# Patient Record
Sex: MALE | Race: WHITE | NOT HISPANIC OR LATINO | URBAN - METROPOLITAN AREA
[De-identification: names, ages, dates, MRNs, and addresses within clinical notes are randomized per-mention and may not be internally consistent; named-entity substitution may affect disease eponyms.]

---

## 2017-12-08 ENCOUNTER — OFFICE VISIT - HEALTHEAST (OUTPATIENT)
Dept: FAMILY MEDICINE | Facility: CLINIC | Age: 52
End: 2017-12-08

## 2017-12-08 DIAGNOSIS — G89.29 HEEL PAIN, CHRONIC, LEFT: ICD-10-CM

## 2017-12-08 DIAGNOSIS — M25.561 CHRONIC PAIN OF RIGHT KNEE: ICD-10-CM

## 2017-12-08 DIAGNOSIS — G89.29 CHRONIC PAIN OF RIGHT KNEE: ICD-10-CM

## 2017-12-08 DIAGNOSIS — K40.91 UNILATERAL RECURRENT INGUINAL HERNIA WITHOUT OBSTRUCTION OR GANGRENE: ICD-10-CM

## 2017-12-08 DIAGNOSIS — M79.672 HEEL PAIN, CHRONIC, LEFT: ICD-10-CM

## 2017-12-08 ASSESSMENT — MIFFLIN-ST. JEOR: SCORE: 1679.55

## 2017-12-11 ENCOUNTER — COMMUNICATION - HEALTHEAST (OUTPATIENT)
Dept: SURGERY | Facility: CLINIC | Age: 52
End: 2017-12-11

## 2017-12-13 ENCOUNTER — RECORDS - HEALTHEAST (OUTPATIENT)
Dept: ADMINISTRATIVE | Facility: OTHER | Age: 52
End: 2017-12-13

## 2017-12-14 ENCOUNTER — OFFICE VISIT - HEALTHEAST (OUTPATIENT)
Dept: SURGERY | Facility: CLINIC | Age: 52
End: 2017-12-14

## 2017-12-14 DIAGNOSIS — K40.90 NON-RECURRENT UNILATERAL INGUINAL HERNIA WITHOUT OBSTRUCTION OR GANGRENE: ICD-10-CM

## 2017-12-14 DIAGNOSIS — Z12.11 SCREENING FOR COLON CANCER: ICD-10-CM

## 2017-12-14 ASSESSMENT — MIFFLIN-ST. JEOR: SCORE: 1686.07

## 2017-12-18 ENCOUNTER — RECORDS - HEALTHEAST (OUTPATIENT)
Dept: ADMINISTRATIVE | Facility: OTHER | Age: 52
End: 2017-12-18

## 2017-12-22 ENCOUNTER — ANESTHESIA - HEALTHEAST (OUTPATIENT)
Dept: SURGERY | Facility: AMBULATORY SURGERY CENTER | Age: 52
End: 2017-12-22

## 2017-12-22 ASSESSMENT — MIFFLIN-ST. JEOR: SCORE: 1654.32

## 2017-12-27 ENCOUNTER — SURGERY - HEALTHEAST (OUTPATIENT)
Dept: SURGERY | Facility: AMBULATORY SURGERY CENTER | Age: 52
End: 2017-12-27

## 2017-12-27 ENCOUNTER — SURGERY - HEALTHEAST (OUTPATIENT)
Dept: SURGERY | Facility: CLINIC | Age: 52
End: 2017-12-27

## 2017-12-27 ENCOUNTER — AMBULATORY - HEALTHEAST (OUTPATIENT)
Dept: MULTI SPECIALTY CLINIC | Facility: CLINIC | Age: 52
End: 2017-12-27

## 2017-12-28 ASSESSMENT — MIFFLIN-ST. JEOR: SCORE: 1654.32

## 2017-12-29 ENCOUNTER — ANESTHESIA - HEALTHEAST (OUTPATIENT)
Dept: SURGERY | Facility: AMBULATORY SURGERY CENTER | Age: 52
End: 2017-12-29

## 2017-12-29 ENCOUNTER — SURGERY - HEALTHEAST (OUTPATIENT)
Dept: SURGERY | Facility: AMBULATORY SURGERY CENTER | Age: 52
End: 2017-12-29

## 2019-01-01 ENCOUNTER — OFFICE VISIT - HEALTHEAST (OUTPATIENT)
Dept: INTERNAL MEDICINE | Facility: CLINIC | Age: 54
End: 2019-01-01

## 2019-01-01 ENCOUNTER — OFFICE VISIT - HEALTHEAST (OUTPATIENT)
Dept: FAMILY MEDICINE | Facility: CLINIC | Age: 54
End: 2019-01-01

## 2019-01-01 ENCOUNTER — HOSPITAL ENCOUNTER (OUTPATIENT)
Dept: ULTRASOUND IMAGING | Facility: HOSPITAL | Age: 54
Discharge: HOME | End: 2019-09-09

## 2019-01-01 DIAGNOSIS — L03.115 CELLULITIS OF RIGHT LOWER EXTREMITY: ICD-10-CM

## 2019-01-01 DIAGNOSIS — Z78.9 NONSMOKER: ICD-10-CM

## 2019-01-01 DIAGNOSIS — M79.89 RIGHT LEG SWELLING: ICD-10-CM

## 2019-01-01 DIAGNOSIS — R03.0 ELEVATED BLOOD PRESSURE READING WITHOUT DIAGNOSIS OF HYPERTENSION: ICD-10-CM

## 2019-01-01 DIAGNOSIS — M70.51 INFRAPATELLAR BURSITIS OF RIGHT KNEE: ICD-10-CM

## 2019-01-01 DIAGNOSIS — N52.9 ERECTILE DYSFUNCTION, UNSPECIFIED ERECTILE DYSFUNCTION TYPE: ICD-10-CM

## 2019-01-01 DIAGNOSIS — L03.115 CELLULITIS OF RIGHT LEG: ICD-10-CM

## 2019-01-01 ASSESSMENT — MIFFLIN-ST. JEOR: SCORE: 1649.79

## 2020-01-01 ENCOUNTER — APPOINTMENT (OUTPATIENT)
Dept: GENERAL RADIOLOGY | Facility: CLINIC | Age: 55
DRG: 003 | End: 2020-01-01
Attending: STUDENT IN AN ORGANIZED HEALTH CARE EDUCATION/TRAINING PROGRAM
Payer: COMMERCIAL

## 2020-01-01 ENCOUNTER — APPOINTMENT (OUTPATIENT)
Dept: ULTRASOUND IMAGING | Facility: CLINIC | Age: 55
DRG: 003 | End: 2020-01-01
Attending: STUDENT IN AN ORGANIZED HEALTH CARE EDUCATION/TRAINING PROGRAM
Payer: COMMERCIAL

## 2020-01-01 ENCOUNTER — SURGERY (OUTPATIENT)
Age: 55
End: 2020-01-01
Payer: COMMERCIAL

## 2020-01-01 ENCOUNTER — ALLIED HEALTH/NURSE VISIT (OUTPATIENT)
Dept: NEUROLOGY | Facility: CLINIC | Age: 55
DRG: 003 | End: 2020-01-01
Attending: PSYCHIATRY & NEUROLOGY
Payer: COMMERCIAL

## 2020-01-01 ENCOUNTER — APPOINTMENT (OUTPATIENT)
Dept: GENERAL RADIOLOGY | Facility: CLINIC | Age: 55
DRG: 003 | End: 2020-01-01
Attending: NURSE PRACTITIONER
Payer: COMMERCIAL

## 2020-01-01 ENCOUNTER — APPOINTMENT (OUTPATIENT)
Dept: CARDIOLOGY | Facility: CLINIC | Age: 55
DRG: 003 | End: 2020-01-01
Attending: STUDENT IN AN ORGANIZED HEALTH CARE EDUCATION/TRAINING PROGRAM
Payer: COMMERCIAL

## 2020-01-01 ENCOUNTER — APPOINTMENT (OUTPATIENT)
Dept: CT IMAGING | Facility: CLINIC | Age: 55
DRG: 003 | End: 2020-01-01
Attending: PHYSICIAN ASSISTANT
Payer: COMMERCIAL

## 2020-01-01 ENCOUNTER — APPOINTMENT (OUTPATIENT)
Dept: CARDIOLOGY | Facility: CLINIC | Age: 55
DRG: 003 | End: 2020-01-01
Attending: NURSE PRACTITIONER
Payer: COMMERCIAL

## 2020-01-01 ENCOUNTER — APPOINTMENT (OUTPATIENT)
Dept: GENERAL RADIOLOGY | Facility: CLINIC | Age: 55
DRG: 003 | End: 2020-01-01
Attending: INTERNAL MEDICINE
Payer: COMMERCIAL

## 2020-01-01 ENCOUNTER — HOSPITAL ENCOUNTER (INPATIENT)
Facility: CLINIC | Age: 55
LOS: 3 days | DRG: 003 | End: 2020-04-14
Admitting: INTERNAL MEDICINE
Payer: COMMERCIAL

## 2020-01-01 ENCOUNTER — COMMUNICATION - HEALTHEAST (OUTPATIENT)
Dept: INTERNAL MEDICINE | Facility: CLINIC | Age: 55
End: 2020-01-01

## 2020-01-01 ENCOUNTER — APPOINTMENT (OUTPATIENT)
Dept: CT IMAGING | Facility: CLINIC | Age: 55
DRG: 003 | End: 2020-01-01
Attending: STUDENT IN AN ORGANIZED HEALTH CARE EDUCATION/TRAINING PROGRAM
Payer: COMMERCIAL

## 2020-01-01 VITALS
WEIGHT: 195.33 LBS | OXYGEN SATURATION: 100 % | RESPIRATION RATE: 14 BRPM | TEMPERATURE: 98.2 F | BODY MASS INDEX: 27.96 KG/M2 | HEIGHT: 70 IN

## 2020-01-01 DIAGNOSIS — I46.9 CARDIAC ARREST (H): Primary | ICD-10-CM

## 2020-01-01 DIAGNOSIS — N52.9 ERECTILE DYSFUNCTION, UNSPECIFIED ERECTILE DYSFUNCTION TYPE: ICD-10-CM

## 2020-01-01 DIAGNOSIS — I46.9 CARDIAC ARREST (H): ICD-10-CM

## 2020-01-01 LAB
ABO + RH BLD: NORMAL
ALBUMIN SERPL-MCNC: 2.5 G/DL (ref 3.4–5)
ALBUMIN SERPL-MCNC: 2.5 G/DL (ref 3.4–5)
ALBUMIN SERPL-MCNC: 2.6 G/DL (ref 3.4–5)
ALBUMIN SERPL-MCNC: 2.8 G/DL (ref 3.4–5)
ALBUMIN SERPL-MCNC: 2.9 G/DL (ref 3.4–5)
ALBUMIN SERPL-MCNC: 2.9 G/DL (ref 3.4–5)
ALBUMIN SERPL-MCNC: 3 G/DL (ref 3.4–5)
ALBUMIN SERPL-MCNC: 3 G/DL (ref 3.4–5)
ALBUMIN SERPL-MCNC: 3.1 G/DL (ref 3.4–5)
ALBUMIN SERPL-MCNC: 3.2 G/DL (ref 3.4–5)
ALBUMIN SERPL-MCNC: 3.4 G/DL (ref 3.4–5)
ALBUMIN UR-MCNC: 100 MG/DL
ALP SERPL-CCNC: 22 U/L (ref 40–150)
ALP SERPL-CCNC: 24 U/L (ref 40–150)
ALP SERPL-CCNC: 25 U/L (ref 40–150)
ALP SERPL-CCNC: 26 U/L (ref 40–150)
ALP SERPL-CCNC: 26 U/L (ref 40–150)
ALP SERPL-CCNC: 27 U/L (ref 40–150)
ALP SERPL-CCNC: 30 U/L (ref 40–150)
ALP SERPL-CCNC: 32 U/L (ref 40–150)
ALP SERPL-CCNC: 34 U/L (ref 40–150)
ALP SERPL-CCNC: 35 U/L (ref 40–150)
ALP SERPL-CCNC: 40 U/L (ref 40–150)
ALP SERPL-CCNC: 41 U/L (ref 40–150)
ALP SERPL-CCNC: 47 U/L (ref 40–150)
ALP SERPL-CCNC: 47 U/L (ref 40–150)
ALP SERPL-CCNC: 48 U/L (ref 40–150)
ALT SERPL W P-5'-P-CCNC: 130 U/L (ref 0–70)
ALT SERPL W P-5'-P-CCNC: 131 U/L (ref 0–70)
ALT SERPL W P-5'-P-CCNC: 138 U/L (ref 0–70)
ALT SERPL W P-5'-P-CCNC: 148 U/L (ref 0–70)
ALT SERPL W P-5'-P-CCNC: 155 U/L (ref 0–70)
ALT SERPL W P-5'-P-CCNC: 162 U/L (ref 0–70)
ALT SERPL W P-5'-P-CCNC: 164 U/L (ref 0–70)
ALT SERPL W P-5'-P-CCNC: 167 U/L (ref 0–70)
ALT SERPL W P-5'-P-CCNC: 175 U/L (ref 0–70)
ALT SERPL W P-5'-P-CCNC: 177 U/L (ref 0–70)
ALT SERPL W P-5'-P-CCNC: 198 U/L (ref 0–70)
ALT SERPL W P-5'-P-CCNC: 200 U/L (ref 0–70)
ALT SERPL W P-5'-P-CCNC: 209 U/L (ref 0–70)
ALT SERPL W P-5'-P-CCNC: 215 U/L (ref 0–70)
ALT SERPL W P-5'-P-CCNC: 219 U/L (ref 0–70)
AMPHETAMINES UR QL SCN: NEGATIVE
ANGLE RATE OF CLOT STRENGTH: 61.2 DEGREES (ref 53–72)
ANGLE RATE OF CLOT STRENGTH: 66.3 DEGREES (ref 53–72)
ANGLE RATE OF CLOT STRENGTH: 69.8 DEGREES (ref 53–72)
ANION GAP SERPL CALCULATED.3IONS-SCNC: 10 MMOL/L (ref 3–14)
ANION GAP SERPL CALCULATED.3IONS-SCNC: 11 MMOL/L (ref 3–14)
ANION GAP SERPL CALCULATED.3IONS-SCNC: 12 MMOL/L (ref 3–14)
ANION GAP SERPL CALCULATED.3IONS-SCNC: 12 MMOL/L (ref 3–14)
ANION GAP SERPL CALCULATED.3IONS-SCNC: 18 MMOL/L (ref 3–14)
ANION GAP SERPL CALCULATED.3IONS-SCNC: 3 MMOL/L (ref 3–14)
ANION GAP SERPL CALCULATED.3IONS-SCNC: 4 MMOL/L (ref 3–14)
ANION GAP SERPL CALCULATED.3IONS-SCNC: 6 MMOL/L (ref 3–14)
ANION GAP SERPL CALCULATED.3IONS-SCNC: 8 MMOL/L (ref 3–14)
ANION GAP SERPL CALCULATED.3IONS-SCNC: 8 MMOL/L (ref 3–14)
ANION GAP SERPL CALCULATED.3IONS-SCNC: 9 MMOL/L (ref 3–14)
APPEARANCE UR: ABNORMAL
APTT PPP: 107 SEC (ref 22–37)
APTT PPP: 119 SEC (ref 22–37)
APTT PPP: 60 SEC (ref 22–37)
APTT PPP: 63 SEC (ref 22–37)
APTT PPP: 67 SEC (ref 22–37)
APTT PPP: 73 SEC (ref 22–37)
APTT PPP: 82 SEC (ref 22–37)
APTT PPP: 86 SEC (ref 22–37)
APTT PPP: 90 SEC (ref 22–37)
APTT PPP: 94 SEC (ref 22–37)
APTT PPP: >240 SEC (ref 22–37)
AST SERPL W P-5'-P-CCNC: 1162 U/L (ref 0–45)
AST SERPL W P-5'-P-CCNC: 1206 U/L (ref 0–45)
AST SERPL W P-5'-P-CCNC: 1376 U/L (ref 0–45)
AST SERPL W P-5'-P-CCNC: 1466 U/L (ref 0–45)
AST SERPL W P-5'-P-CCNC: 1510 U/L (ref 0–45)
AST SERPL W P-5'-P-CCNC: 1575 U/L (ref 0–45)
AST SERPL W P-5'-P-CCNC: 310 U/L (ref 0–45)
AST SERPL W P-5'-P-CCNC: 585 U/L (ref 0–45)
AST SERPL W P-5'-P-CCNC: 599 U/L (ref 0–45)
AST SERPL W P-5'-P-CCNC: 672 U/L (ref 0–45)
AST SERPL W P-5'-P-CCNC: 724 U/L (ref 0–45)
AST SERPL W P-5'-P-CCNC: 795 U/L (ref 0–45)
AST SERPL W P-5'-P-CCNC: 905 U/L (ref 0–45)
AST SERPL W P-5'-P-CCNC: 967 U/L (ref 0–45)
AST SERPL W P-5'-P-CCNC: 996 U/L (ref 0–45)
AT III ACT/NOR PPP CHRO: 48 % (ref 85–135)
AT III ACT/NOR PPP CHRO: 52 % (ref 85–135)
AT III ACT/NOR PPP CHRO: 54 % (ref 85–135)
AT III ACT/NOR PPP CHRO: 68 % (ref 85–135)
BACTERIA SPEC CULT: NO GROWTH
BACTERIA SPEC CULT: NO GROWTH
BACTERIA SPEC CULT: NORMAL
BARBITURATES UR QL: NEGATIVE
BASE DEFICIT BLDA-SCNC: 0.3 MMOL/L
BASE DEFICIT BLDA-SCNC: 0.4 MMOL/L
BASE DEFICIT BLDA-SCNC: 0.5 MMOL/L
BASE DEFICIT BLDA-SCNC: 0.7 MMOL/L
BASE DEFICIT BLDA-SCNC: 0.8 MMOL/L
BASE DEFICIT BLDA-SCNC: 0.9 MMOL/L
BASE DEFICIT BLDA-SCNC: 1 MMOL/L
BASE DEFICIT BLDA-SCNC: 1.2 MMOL/L
BASE DEFICIT BLDA-SCNC: 1.3 MMOL/L
BASE DEFICIT BLDA-SCNC: 1.5 MMOL/L
BASE DEFICIT BLDA-SCNC: 1.7 MMOL/L
BASE DEFICIT BLDA-SCNC: 1.9 MMOL/L
BASE DEFICIT BLDA-SCNC: 1.9 MMOL/L
BASE DEFICIT BLDA-SCNC: 13.4 MMOL/L
BASE DEFICIT BLDA-SCNC: 2 MMOL/L
BASE DEFICIT BLDA-SCNC: 2.3 MMOL/L
BASE DEFICIT BLDA-SCNC: 2.5 MMOL/L
BASE DEFICIT BLDA-SCNC: 2.6 MMOL/L
BASE DEFICIT BLDA-SCNC: 2.7 MMOL/L
BASE DEFICIT BLDA-SCNC: 2.8 MMOL/L
BASE DEFICIT BLDA-SCNC: 3.1 MMOL/L
BASE DEFICIT BLDA-SCNC: 3.1 MMOL/L
BASE DEFICIT BLDA-SCNC: 3.4 MMOL/L
BASE DEFICIT BLDA-SCNC: 4.9 MMOL/L
BASE DEFICIT BLDA-SCNC: 5 MMOL/L
BASE DEFICIT BLDA-SCNC: 6.8 MMOL/L
BASE DEFICIT BLDA-SCNC: 7.1 MMOL/L
BASE DEFICIT BLDA-SCNC: 9 MMOL/L
BASE DEFICIT BLDA-SCNC: 9.2 MMOL/L
BASE DEFICIT BLDA-SCNC: NORMAL MMOL/L
BASE DEFICIT BLDA-SCNC: NORMAL MMOL/L
BASE DEFICIT BLDV-SCNC: 0.5 MMOL/L
BASE DEFICIT BLDV-SCNC: 1.5 MMOL/L
BASE DEFICIT BLDV-SCNC: 1.8 MMOL/L
BASE DEFICIT BLDV-SCNC: 4.3 MMOL/L
BASE EXCESS BLDA CALC-SCNC: 0 MMOL/L
BASE EXCESS BLDA CALC-SCNC: 0.1 MMOL/L
BASE EXCESS BLDA CALC-SCNC: 0.1 MMOL/L
BASE EXCESS BLDA CALC-SCNC: 0.2 MMOL/L
BASE EXCESS BLDA CALC-SCNC: 0.3 MMOL/L
BASE EXCESS BLDA CALC-SCNC: 0.4 MMOL/L
BASE EXCESS BLDA CALC-SCNC: 0.5 MMOL/L
BASE EXCESS BLDA CALC-SCNC: 0.7 MMOL/L
BASE EXCESS BLDA CALC-SCNC: 0.7 MMOL/L
BASE EXCESS BLDA CALC-SCNC: 0.8 MMOL/L
BASE EXCESS BLDA CALC-SCNC: NORMAL MMOL/L
BASE EXCESS BLDA CALC-SCNC: NORMAL MMOL/L
BASE EXCESS BLDV CALC-SCNC: 0.1 MMOL/L
BASE EXCESS BLDV CALC-SCNC: 0.5 MMOL/L
BASE EXCESS BLDV CALC-SCNC: 0.5 MMOL/L
BASE EXCESS BLDV CALC-SCNC: 0.6 MMOL/L
BASE EXCESS BLDV CALC-SCNC: 1.1 MMOL/L
BASOPHILS # BLD AUTO: 0 10E9/L (ref 0–0.2)
BASOPHILS # BLD AUTO: 0 10E9/L (ref 0–0.2)
BASOPHILS NFR BLD AUTO: 0.4 %
BASOPHILS NFR BLD AUTO: 0.4 %
BENZODIAZ UR QL: NEGATIVE
BILIRUB SERPL-MCNC: 0.4 MG/DL (ref 0.2–1.3)
BILIRUB SERPL-MCNC: 0.5 MG/DL (ref 0.2–1.3)
BILIRUB SERPL-MCNC: 0.5 MG/DL (ref 0.2–1.3)
BILIRUB SERPL-MCNC: 0.6 MG/DL (ref 0.2–1.3)
BILIRUB SERPL-MCNC: 0.7 MG/DL (ref 0.2–1.3)
BILIRUB SERPL-MCNC: 0.7 MG/DL (ref 0.2–1.3)
BILIRUB SERPL-MCNC: 0.8 MG/DL (ref 0.2–1.3)
BILIRUB SERPL-MCNC: 0.8 MG/DL (ref 0.2–1.3)
BILIRUB SERPL-MCNC: 0.9 MG/DL (ref 0.2–1.3)
BILIRUB SERPL-MCNC: 1 MG/DL (ref 0.2–1.3)
BILIRUB SERPL-MCNC: 1 MG/DL (ref 0.2–1.3)
BILIRUB SERPL-MCNC: 1.2 MG/DL (ref 0.2–1.3)
BILIRUB SERPL-MCNC: 1.2 MG/DL (ref 0.2–1.3)
BILIRUB UR QL STRIP: NEGATIVE
BLD GP AB SCN SERPL QL: NORMAL
BLD GP AB SCN SERPL QL: NORMAL
BLD PROD TYP BPU: NORMAL
BLD UNIT ID BPU: 0
BLOOD BANK CMNT PATIENT-IMP: NORMAL
BLOOD BANK CMNT PATIENT-IMP: NORMAL
BLOOD PRODUCT CODE: NORMAL
BPU ID: NORMAL
BUN SERPL-MCNC: 15 MG/DL (ref 7–30)
BUN SERPL-MCNC: 16 MG/DL (ref 7–30)
BUN SERPL-MCNC: 19 MG/DL (ref 7–30)
BUN SERPL-MCNC: 22 MG/DL (ref 7–30)
BUN SERPL-MCNC: 25 MG/DL (ref 7–30)
BUN SERPL-MCNC: 28 MG/DL (ref 7–30)
BUN SERPL-MCNC: 31 MG/DL (ref 7–30)
BUN SERPL-MCNC: 34 MG/DL (ref 7–30)
BUN SERPL-MCNC: 36 MG/DL (ref 7–30)
BUN SERPL-MCNC: 41 MG/DL (ref 7–30)
BUN SERPL-MCNC: 42 MG/DL (ref 7–30)
BUN SERPL-MCNC: 42 MG/DL (ref 7–30)
BUN SERPL-MCNC: 43 MG/DL (ref 7–30)
BUN SERPL-MCNC: 43 MG/DL (ref 7–30)
BUN SERPL-MCNC: 44 MG/DL (ref 7–30)
CA-I BLD-MCNC: 3.7 MG/DL (ref 4.4–5.2)
CA-I BLD-MCNC: 3.7 MG/DL (ref 4.4–5.2)
CA-I BLD-MCNC: 3.8 MG/DL (ref 4.4–5.2)
CA-I BLD-MCNC: 3.9 MG/DL (ref 4.4–5.2)
CA-I BLD-MCNC: 4 MG/DL (ref 4.4–5.2)
CA-I BLD-MCNC: 4.1 MG/DL (ref 4.4–5.2)
CA-I BLD-MCNC: 4.3 MG/DL (ref 4.4–5.2)
CA-I BLD-MCNC: 4.4 MG/DL (ref 4.4–5.2)
CA-I BLD-MCNC: 4.5 MG/DL (ref 4.4–5.2)
CA-I BLD-MCNC: 4.6 MG/DL (ref 4.4–5.2)
CA-I BLD-MCNC: 4.6 MG/DL (ref 4.4–5.2)
CA-I BLD-MCNC: NORMAL MG/DL (ref 4.4–5.2)
CALCIUM SERPL-MCNC: 6.8 MG/DL (ref 8.5–10.1)
CALCIUM SERPL-MCNC: 7 MG/DL (ref 8.5–10.1)
CALCIUM SERPL-MCNC: 7.1 MG/DL (ref 8.5–10.1)
CALCIUM SERPL-MCNC: 7.2 MG/DL (ref 8.5–10.1)
CALCIUM SERPL-MCNC: 7.2 MG/DL (ref 8.5–10.1)
CALCIUM SERPL-MCNC: 7.3 MG/DL (ref 8.5–10.1)
CALCIUM SERPL-MCNC: 7.4 MG/DL (ref 8.5–10.1)
CALCIUM SERPL-MCNC: 7.7 MG/DL (ref 8.5–10.1)
CALCIUM SERPL-MCNC: 7.8 MG/DL (ref 8.5–10.1)
CALCIUM SERPL-MCNC: 7.8 MG/DL (ref 8.5–10.1)
CALCIUM SERPL-MCNC: 8 MG/DL (ref 8.5–10.1)
CANNABINOIDS UR QL SCN: NEGATIVE
CHLORIDE SERPL-SCNC: 108 MMOL/L (ref 94–109)
CHLORIDE SERPL-SCNC: 112 MMOL/L (ref 94–109)
CHLORIDE SERPL-SCNC: 113 MMOL/L (ref 94–109)
CHLORIDE SERPL-SCNC: 114 MMOL/L (ref 94–109)
CHLORIDE SERPL-SCNC: 115 MMOL/L (ref 94–109)
CHLORIDE SERPL-SCNC: 116 MMOL/L (ref 94–109)
CHLORIDE SERPL-SCNC: 117 MMOL/L (ref 94–109)
CHLORIDE SERPL-SCNC: 118 MMOL/L (ref 94–109)
CHLORIDE SERPL-SCNC: 120 MMOL/L (ref 94–109)
CHLORIDE SERPL-SCNC: 122 MMOL/L (ref 94–109)
CHLORIDE SERPL-SCNC: 123 MMOL/L (ref 94–109)
CI HYPOCOAGULATION INDEX: 1 RATIO (ref 0–3)
CI HYPOCOAGULATION INDEX: 1.2 RATIO (ref 0–3)
CI HYPOCOAGULATION INDEX: 2.9 RATIO (ref 0–3)
CO2 SERPL-SCNC: 18 MMOL/L (ref 20–32)
CO2 SERPL-SCNC: 20 MMOL/L (ref 20–32)
CO2 SERPL-SCNC: 22 MMOL/L (ref 20–32)
CO2 SERPL-SCNC: 23 MMOL/L (ref 20–32)
CO2 SERPL-SCNC: 25 MMOL/L (ref 20–32)
CO2 SERPL-SCNC: 26 MMOL/L (ref 20–32)
COCAINE UR QL: NEGATIVE
COLOR UR AUTO: YELLOW
CORTIS SERPL-MCNC: 24.6 UG/DL (ref 4–22)
CREAT SERPL-MCNC: 1.14 MG/DL (ref 0.66–1.25)
CREAT SERPL-MCNC: 1.14 MG/DL (ref 0.66–1.25)
CREAT SERPL-MCNC: 1.18 MG/DL (ref 0.66–1.25)
CREAT SERPL-MCNC: 1.5 MG/DL (ref 0.66–1.25)
CREAT SERPL-MCNC: 1.61 MG/DL (ref 0.66–1.25)
CREAT SERPL-MCNC: 1.83 MG/DL (ref 0.66–1.25)
CREAT SERPL-MCNC: 2.14 MG/DL (ref 0.66–1.25)
CREAT SERPL-MCNC: 2.16 MG/DL (ref 0.66–1.25)
CREAT SERPL-MCNC: 2.18 MG/DL (ref 0.66–1.25)
CREAT SERPL-MCNC: 2.21 MG/DL (ref 0.66–1.25)
CREAT SERPL-MCNC: 2.23 MG/DL (ref 0.66–1.25)
CREAT SERPL-MCNC: 2.42 MG/DL (ref 0.66–1.25)
CREAT SERPL-MCNC: 2.46 MG/DL (ref 0.66–1.25)
CREAT SERPL-MCNC: 2.6 MG/DL (ref 0.66–1.25)
CREAT SERPL-MCNC: 2.68 MG/DL (ref 0.66–1.25)
CRP SERPL-MCNC: 180 MG/L (ref 0–8)
CRP SERPL-MCNC: 290 MG/L (ref 0–8)
CRP SERPL-MCNC: 32 MG/L (ref 0–8)
CRP SERPL-MCNC: <2.9 MG/L (ref 0–8)
D DIMER PPP FEU-MCNC: 0.9 UG/ML FEU (ref 0–0.5)
D DIMER PPP FEU-MCNC: 1.1 UG/ML FEU (ref 0–0.5)
D DIMER PPP FEU-MCNC: 1.1 UG/ML FEU (ref 0–0.5)
D DIMER PPP FEU-MCNC: 1.2 UG/ML FEU (ref 0–0.5)
D DIMER PPP FEU-MCNC: 1.6 UG/ML FEU (ref 0–0.5)
D DIMER PPP FEU-MCNC: 11.5 UG/ML FEU (ref 0–0.5)
D DIMER PPP FEU-MCNC: 2.7 UG/ML FEU (ref 0–0.5)
D DIMER PPP FEU-MCNC: 8 UG/ML FEU (ref 0–0.5)
D DIMER PPP FEU-MCNC: 9.9 UG/ML FEU (ref 0–0.5)
DIFFERENTIAL METHOD BLD: ABNORMAL
DIFFERENTIAL METHOD BLD: ABNORMAL
EOSINOPHIL # BLD AUTO: 0 10E9/L (ref 0–0.7)
EOSINOPHIL # BLD AUTO: 0.1 10E9/L (ref 0–0.7)
EOSINOPHIL NFR BLD AUTO: 0.3 %
EOSINOPHIL NFR BLD AUTO: 0.7 %
ERYTHROCYTE [DISTWIDTH] IN BLOOD BY AUTOMATED COUNT: 13.1 % (ref 10–15)
ERYTHROCYTE [DISTWIDTH] IN BLOOD BY AUTOMATED COUNT: 13.2 % (ref 10–15)
ERYTHROCYTE [DISTWIDTH] IN BLOOD BY AUTOMATED COUNT: 13.2 % (ref 10–15)
ERYTHROCYTE [DISTWIDTH] IN BLOOD BY AUTOMATED COUNT: 13.4 % (ref 10–15)
ERYTHROCYTE [DISTWIDTH] IN BLOOD BY AUTOMATED COUNT: 13.8 % (ref 10–15)
ERYTHROCYTE [DISTWIDTH] IN BLOOD BY AUTOMATED COUNT: 14.2 % (ref 10–15)
ERYTHROCYTE [DISTWIDTH] IN BLOOD BY AUTOMATED COUNT: 14.7 % (ref 10–15)
ERYTHROCYTE [DISTWIDTH] IN BLOOD BY AUTOMATED COUNT: 14.8 % (ref 10–15)
ERYTHROCYTE [DISTWIDTH] IN BLOOD BY AUTOMATED COUNT: 15.2 % (ref 10–15)
ERYTHROCYTE [DISTWIDTH] IN BLOOD BY AUTOMATED COUNT: 15.4 % (ref 10–15)
ERYTHROCYTE [DISTWIDTH] IN BLOOD BY AUTOMATED COUNT: 15.5 % (ref 10–15)
ERYTHROCYTE [DISTWIDTH] IN BLOOD BY AUTOMATED COUNT: 16.9 % (ref 10–15)
ERYTHROCYTE [DISTWIDTH] IN BLOOD BY AUTOMATED COUNT: 17.2 % (ref 10–15)
ERYTHROCYTE [SEDIMENTATION RATE] IN BLOOD BY WESTERGREN METHOD: 15 MM/H (ref 0–20)
ERYTHROCYTE [SEDIMENTATION RATE] IN BLOOD BY WESTERGREN METHOD: 18 MM/H (ref 0–20)
ERYTHROCYTE [SEDIMENTATION RATE] IN BLOOD BY WESTERGREN METHOD: 82 MM/H (ref 0–20)
ERYTHROCYTE [SEDIMENTATION RATE] IN BLOOD BY WESTERGREN METHOD: 9 MM/H (ref 0–20)
ETHANOL UR QL SCN: NEGATIVE
FIBRINOGEN PPP-MCNC: 186 MG/DL (ref 200–420)
FIBRINOGEN PPP-MCNC: 250 MG/DL (ref 200–420)
FIBRINOGEN PPP-MCNC: 252 MG/DL (ref 200–420)
FIBRINOGEN PPP-MCNC: 358 MG/DL (ref 200–420)
FIBRINOGEN PPP-MCNC: 559 MG/DL (ref 200–420)
FIBRINOGEN PPP-MCNC: 743 MG/DL (ref 200–420)
FIBRINOGEN PPP-MCNC: 778 MG/DL (ref 200–420)
G ACTUAL CLOT STRENGTH: 6.4 KD/SC (ref 4.5–11)
G ACTUAL CLOT STRENGTH: 6.5 KD/SC (ref 4.5–11)
G ACTUAL CLOT STRENGTH: 9.3 KD/SC (ref 4.5–11)
GFR SERPL CREATININE-BSD FRML MDRD: 18 ML/MIN/{1.73_M2}
GFR SERPL CREATININE-BSD FRML MDRD: 21 ML/MIN/{1.73_M2}
GFR SERPL CREATININE-BSD FRML MDRD: 21 ML/MIN/{1.73_M2}
GFR SERPL CREATININE-BSD FRML MDRD: 26 ML/MIN/{1.73_M2}
GFR SERPL CREATININE-BSD FRML MDRD: 26 ML/MIN/{1.73_M2}
GFR SERPL CREATININE-BSD FRML MDRD: 27 ML/MIN/{1.73_M2}
GFR SERPL CREATININE-BSD FRML MDRD: 29 ML/MIN/{1.73_M2}
GFR SERPL CREATININE-BSD FRML MDRD: 30 ML/MIN/{1.73_M2}
GFR SERPL CREATININE-BSD FRML MDRD: 32 ML/MIN/{1.73_M2}
GFR SERPL CREATININE-BSD FRML MDRD: 32 ML/MIN/{1.73_M2}
GFR SERPL CREATININE-BSD FRML MDRD: 33 ML/MIN/{1.73_M2}
GFR SERPL CREATININE-BSD FRML MDRD: 34 ML/MIN/{1.73_M2}
GFR SERPL CREATININE-BSD FRML MDRD: 44 ML/MIN/{1.73_M2}
GFR SERPL CREATININE-BSD FRML MDRD: 46 ML/MIN/{1.73_M2}
GFR SERPL CREATININE-BSD FRML MDRD: 46 ML/MIN/{1.73_M2}
GLUCOSE BLD-MCNC: 111 MG/DL (ref 70–99)
GLUCOSE BLD-MCNC: 111 MG/DL (ref 70–99)
GLUCOSE BLD-MCNC: 112 MG/DL (ref 70–99)
GLUCOSE BLD-MCNC: 118 MG/DL (ref 70–99)
GLUCOSE BLD-MCNC: 121 MG/DL (ref 70–99)
GLUCOSE BLD-MCNC: 124 MG/DL (ref 70–99)
GLUCOSE BLD-MCNC: 127 MG/DL (ref 70–99)
GLUCOSE BLD-MCNC: 146 MG/DL (ref 70–99)
GLUCOSE BLD-MCNC: 153 MG/DL (ref 70–99)
GLUCOSE BLD-MCNC: 162 MG/DL (ref 70–99)
GLUCOSE BLD-MCNC: 168 MG/DL (ref 70–99)
GLUCOSE BLD-MCNC: 177 MG/DL (ref 70–99)
GLUCOSE BLD-MCNC: 179 MG/DL (ref 70–99)
GLUCOSE BLD-MCNC: 215 MG/DL (ref 70–99)
GLUCOSE BLD-MCNC: 221 MG/DL (ref 70–99)
GLUCOSE BLD-MCNC: 246 MG/DL (ref 70–99)
GLUCOSE BLD-MCNC: 332 MG/DL (ref 70–99)
GLUCOSE BLD-MCNC: 95 MG/DL (ref 70–99)
GLUCOSE BLD-MCNC: 97 MG/DL (ref 70–99)
GLUCOSE BLD-MCNC: 99 MG/DL (ref 70–99)
GLUCOSE BLD-MCNC: NORMAL MG/DL (ref 70–99)
GLUCOSE BLDC GLUCOMTR-MCNC: 103 MG/DL (ref 70–99)
GLUCOSE BLDC GLUCOMTR-MCNC: 105 MG/DL (ref 70–99)
GLUCOSE BLDC GLUCOMTR-MCNC: 106 MG/DL (ref 70–99)
GLUCOSE BLDC GLUCOMTR-MCNC: 107 MG/DL (ref 70–99)
GLUCOSE BLDC GLUCOMTR-MCNC: 108 MG/DL (ref 70–99)
GLUCOSE BLDC GLUCOMTR-MCNC: 108 MG/DL (ref 70–99)
GLUCOSE BLDC GLUCOMTR-MCNC: 110 MG/DL (ref 70–99)
GLUCOSE BLDC GLUCOMTR-MCNC: 110 MG/DL (ref 70–99)
GLUCOSE BLDC GLUCOMTR-MCNC: 115 MG/DL (ref 70–99)
GLUCOSE BLDC GLUCOMTR-MCNC: 117 MG/DL (ref 70–99)
GLUCOSE BLDC GLUCOMTR-MCNC: 118 MG/DL (ref 70–99)
GLUCOSE BLDC GLUCOMTR-MCNC: 119 MG/DL (ref 70–99)
GLUCOSE BLDC GLUCOMTR-MCNC: 119 MG/DL (ref 70–99)
GLUCOSE BLDC GLUCOMTR-MCNC: 120 MG/DL (ref 70–99)
GLUCOSE BLDC GLUCOMTR-MCNC: 121 MG/DL (ref 70–99)
GLUCOSE BLDC GLUCOMTR-MCNC: 122 MG/DL (ref 70–99)
GLUCOSE BLDC GLUCOMTR-MCNC: 122 MG/DL (ref 70–99)
GLUCOSE BLDC GLUCOMTR-MCNC: 123 MG/DL (ref 70–99)
GLUCOSE BLDC GLUCOMTR-MCNC: 125 MG/DL (ref 70–99)
GLUCOSE BLDC GLUCOMTR-MCNC: 126 MG/DL (ref 70–99)
GLUCOSE BLDC GLUCOMTR-MCNC: 137 MG/DL (ref 70–99)
GLUCOSE BLDC GLUCOMTR-MCNC: 144 MG/DL (ref 70–99)
GLUCOSE BLDC GLUCOMTR-MCNC: 145 MG/DL (ref 70–99)
GLUCOSE BLDC GLUCOMTR-MCNC: 149 MG/DL (ref 70–99)
GLUCOSE BLDC GLUCOMTR-MCNC: 154 MG/DL (ref 70–99)
GLUCOSE BLDC GLUCOMTR-MCNC: 158 MG/DL (ref 70–99)
GLUCOSE BLDC GLUCOMTR-MCNC: 163 MG/DL (ref 70–99)
GLUCOSE BLDC GLUCOMTR-MCNC: 164 MG/DL (ref 70–99)
GLUCOSE BLDC GLUCOMTR-MCNC: 169 MG/DL (ref 70–99)
GLUCOSE BLDC GLUCOMTR-MCNC: 179 MG/DL (ref 70–99)
GLUCOSE BLDC GLUCOMTR-MCNC: 195 MG/DL (ref 70–99)
GLUCOSE BLDC GLUCOMTR-MCNC: 210 MG/DL (ref 70–99)
GLUCOSE BLDC GLUCOMTR-MCNC: 90 MG/DL (ref 70–99)
GLUCOSE BLDC GLUCOMTR-MCNC: 93 MG/DL (ref 70–99)
GLUCOSE BLDC GLUCOMTR-MCNC: 96 MG/DL (ref 70–99)
GLUCOSE SERPL-MCNC: 106 MG/DL (ref 70–99)
GLUCOSE SERPL-MCNC: 112 MG/DL (ref 70–99)
GLUCOSE SERPL-MCNC: 119 MG/DL (ref 70–99)
GLUCOSE SERPL-MCNC: 121 MG/DL (ref 70–99)
GLUCOSE SERPL-MCNC: 122 MG/DL (ref 70–99)
GLUCOSE SERPL-MCNC: 126 MG/DL (ref 70–99)
GLUCOSE SERPL-MCNC: 134 MG/DL (ref 70–99)
GLUCOSE SERPL-MCNC: 150 MG/DL (ref 70–99)
GLUCOSE SERPL-MCNC: 169 MG/DL (ref 70–99)
GLUCOSE SERPL-MCNC: 172 MG/DL (ref 70–99)
GLUCOSE SERPL-MCNC: 174 MG/DL (ref 70–99)
GLUCOSE SERPL-MCNC: 187 MG/DL (ref 70–99)
GLUCOSE SERPL-MCNC: 215 MG/DL (ref 70–99)
GLUCOSE SERPL-MCNC: 95 MG/DL (ref 70–99)
GLUCOSE SERPL-MCNC: 99 MG/DL (ref 70–99)
GLUCOSE UR STRIP-MCNC: 70 MG/DL
GRAM STN SPEC: NORMAL
GRAM STN SPEC: NORMAL
HBA1C MFR BLD: 5.1 % (ref 0–5.6)
HCO3 BLD-SCNC: 16 MMOL/L (ref 21–28)
HCO3 BLD-SCNC: 17 MMOL/L (ref 21–28)
HCO3 BLD-SCNC: 17 MMOL/L (ref 21–28)
HCO3 BLD-SCNC: 18 MMOL/L (ref 21–28)
HCO3 BLD-SCNC: 19 MMOL/L (ref 21–28)
HCO3 BLD-SCNC: 19 MMOL/L (ref 21–28)
HCO3 BLD-SCNC: 21 MMOL/L (ref 21–28)
HCO3 BLD-SCNC: 22 MMOL/L (ref 21–28)
HCO3 BLD-SCNC: 23 MMOL/L (ref 21–28)
HCO3 BLD-SCNC: 24 MMOL/L (ref 21–28)
HCO3 BLD-SCNC: 25 MMOL/L (ref 21–28)
HCO3 BLD-SCNC: 26 MMOL/L (ref 21–28)
HCO3 BLD-SCNC: NORMAL MMOL/L (ref 21–28)
HCO3 BLDA-SCNC: 21 MMOL/L (ref 21–28)
HCO3 BLDA-SCNC: 23 MMOL/L (ref 21–28)
HCO3 BLDA-SCNC: 24 MMOL/L (ref 21–28)
HCO3 BLDA-SCNC: 24 MMOL/L (ref 21–28)
HCO3 BLDA-SCNC: 25 MMOL/L (ref 21–28)
HCO3 BLDA-SCNC: 26 MMOL/L (ref 21–28)
HCO3 BLDA-SCNC: 26 MMOL/L (ref 21–28)
HCO3 BLDA-SCNC: NORMAL MMOL/L (ref 21–28)
HCO3 BLDV-SCNC: 23 MMOL/L (ref 21–28)
HCO3 BLDV-SCNC: 24 MMOL/L (ref 21–28)
HCO3 BLDV-SCNC: 26 MMOL/L (ref 21–28)
HCO3 BLDV-SCNC: 27 MMOL/L (ref 21–28)
HCT VFR BLD AUTO: 22.8 % (ref 40–53)
HCT VFR BLD AUTO: 22.9 % (ref 40–53)
HCT VFR BLD AUTO: 23.5 % (ref 40–53)
HCT VFR BLD AUTO: 24.2 % (ref 40–53)
HCT VFR BLD AUTO: 24.2 % (ref 40–53)
HCT VFR BLD AUTO: 24.4 % (ref 40–53)
HCT VFR BLD AUTO: 24.8 % (ref 40–53)
HCT VFR BLD AUTO: 25.5 % (ref 40–53)
HCT VFR BLD AUTO: 25.6 % (ref 40–53)
HCT VFR BLD AUTO: 25.6 % (ref 40–53)
HCT VFR BLD AUTO: 25.9 % (ref 40–53)
HCT VFR BLD AUTO: 27 % (ref 40–53)
HCT VFR BLD AUTO: 36 % (ref 40–53)
HCT VFR BLD AUTO: 37 % (ref 40–53)
HCT VFR BLD AUTO: 37.1 % (ref 40–53)
HGB BLD-MCNC: 11.1 G/DL (ref 13.3–17.7)
HGB BLD-MCNC: 11.5 G/DL (ref 13.3–17.7)
HGB BLD-MCNC: 12.1 G/DL (ref 13.3–17.7)
HGB BLD-MCNC: 12.2 G/DL (ref 13.3–17.7)
HGB BLD-MCNC: 12.2 G/DL (ref 13.3–17.7)
HGB BLD-MCNC: 14 G/DL (ref 13.3–17.7)
HGB BLD-MCNC: 7.3 G/DL (ref 13.3–17.7)
HGB BLD-MCNC: 7.6 G/DL (ref 13.3–17.7)
HGB BLD-MCNC: 7.7 G/DL (ref 13.3–17.7)
HGB BLD-MCNC: 7.9 G/DL (ref 13.3–17.7)
HGB BLD-MCNC: 7.9 G/DL (ref 13.3–17.7)
HGB BLD-MCNC: 8 G/DL (ref 13.3–17.7)
HGB BLD-MCNC: 8.1 G/DL (ref 13.3–17.7)
HGB BLD-MCNC: 8.4 G/DL (ref 13.3–17.7)
HGB BLD-MCNC: 8.5 G/DL (ref 13.3–17.7)
HGB BLD-MCNC: 8.7 G/DL (ref 13.3–17.7)
HGB BLD-MCNC: 8.8 G/DL (ref 13.3–17.7)
HGB BLD-MCNC: 9.1 G/DL (ref 13.3–17.7)
HGB FREE PLAS-MCNC: 30 MG/DL
HGB FREE PLAS-MCNC: 50 MG/DL
HGB FREE PLAS-MCNC: 70 MG/DL
HGB FREE PLAS-MCNC: <30 MG/DL
HGB UR QL STRIP: ABNORMAL
IMM GRANULOCYTES # BLD: 0.1 10E9/L (ref 0–0.4)
IMM GRANULOCYTES # BLD: 0.2 10E9/L (ref 0–0.4)
IMM GRANULOCYTES NFR BLD: 1.5 %
IMM GRANULOCYTES NFR BLD: 1.7 %
INR PPP: 1.15 (ref 0.86–1.14)
INR PPP: 1.15 (ref 0.86–1.14)
INR PPP: 1.23 (ref 0.86–1.14)
INR PPP: 1.29 (ref 0.86–1.14)
INR PPP: 1.37 (ref 0.86–1.14)
INR PPP: 1.37 (ref 0.86–1.14)
INR PPP: 1.38 (ref 0.86–1.14)
INR PPP: 1.44 (ref 0.86–1.14)
INR PPP: 1.44 (ref 0.86–1.14)
INR PPP: 1.45 (ref 0.86–1.14)
INR PPP: 1.46 (ref 0.86–1.14)
INR PPP: 1.49 (ref 0.86–1.14)
INR PPP: 1.52 (ref 0.86–1.14)
INR PPP: 1.53 (ref 0.86–1.14)
INTERPRETATION ECG - MUSE: NORMAL
K TIME TO SPEC CLOT STRENGTH: 1.4 MINUTE (ref 1–3)
K TIME TO SPEC CLOT STRENGTH: 1.8 MINUTE (ref 1–3)
K TIME TO SPEC CLOT STRENGTH: 2.1 MINUTE (ref 1–3)
KCT BLD-ACNC: 146 SEC (ref 75–150)
KCT BLD-ACNC: 150 SEC (ref 75–150)
KCT BLD-ACNC: 151 SEC (ref 75–150)
KCT BLD-ACNC: 154 SEC (ref 75–150)
KCT BLD-ACNC: 155 SEC (ref 75–150)
KCT BLD-ACNC: 158 SEC (ref 75–150)
KCT BLD-ACNC: 159 SEC (ref 75–150)
KCT BLD-ACNC: 162 SEC (ref 75–150)
KCT BLD-ACNC: 163 SEC (ref 75–150)
KCT BLD-ACNC: 167 SEC (ref 75–150)
KCT BLD-ACNC: 167 SEC (ref 75–150)
KCT BLD-ACNC: 171 SEC (ref 75–150)
KCT BLD-ACNC: 171 SEC (ref 75–150)
KCT BLD-ACNC: 175 SEC (ref 75–150)
KCT BLD-ACNC: 179 SEC (ref 75–150)
KCT BLD-ACNC: 179 SEC (ref 75–150)
KCT BLD-ACNC: 199 SEC (ref 75–150)
KCT BLD-ACNC: 203 SEC (ref 75–150)
KCT BLD-ACNC: 231 SEC (ref 75–150)
KETONES UR STRIP-MCNC: 5 MG/DL
LACTATE BLD-SCNC: 1.4 MMOL/L (ref 0.7–2)
LACTATE BLD-SCNC: 1.4 MMOL/L (ref 0.7–2)
LACTATE BLD-SCNC: 1.6 MMOL/L (ref 0.7–2)
LACTATE BLD-SCNC: 1.8 MMOL/L (ref 0.7–2)
LACTATE BLD-SCNC: 1.8 MMOL/L (ref 0.7–2)
LACTATE BLD-SCNC: 10.8 MMOL/L (ref 0.7–2)
LACTATE BLD-SCNC: 11.1 MMOL/L (ref 0.7–2)
LACTATE BLD-SCNC: 11.9 MMOL/L (ref 0.7–2)
LACTATE BLD-SCNC: 12.5 MMOL/L (ref 0.7–2)
LACTATE BLD-SCNC: 2.7 MMOL/L (ref 0.7–2)
LACTATE BLD-SCNC: 2.9 MMOL/L (ref 0.7–2)
LACTATE BLD-SCNC: 3.2 MMOL/L (ref 0.7–2)
LACTATE BLD-SCNC: 3.3 MMOL/L (ref 0.7–2)
LACTATE BLD-SCNC: 3.4 MMOL/L (ref 0.7–2)
LACTATE BLD-SCNC: 3.7 MMOL/L (ref 0.7–2)
LACTATE BLD-SCNC: 4.8 MMOL/L (ref 0.7–2)
LACTATE BLD-SCNC: 4.8 MMOL/L (ref 0.7–2)
LACTATE BLD-SCNC: 6.9 MMOL/L (ref 0.7–2)
LACTATE BLD-SCNC: 9 MMOL/L (ref 0.7–2)
LACTATE BLD-SCNC: NORMAL MMOL/L (ref 0.7–2)
LDH SERPL L TO P-CCNC: 1958 U/L (ref 85–227)
LDH SERPL L TO P-CCNC: 2058 U/L (ref 85–227)
LDH SERPL L TO P-CCNC: 2138 U/L (ref 85–227)
LDH SERPL L TO P-CCNC: 815 U/L (ref 85–227)
LEUKOCYTE ESTERASE UR QL STRIP: NEGATIVE
LMWH PPP CHRO-ACNC: 0.17 IU/ML
LMWH PPP CHRO-ACNC: 0.18 IU/ML
LMWH PPP CHRO-ACNC: 0.18 IU/ML
LMWH PPP CHRO-ACNC: 0.21 IU/ML
LMWH PPP CHRO-ACNC: 0.24 IU/ML
LMWH PPP CHRO-ACNC: 0.25 IU/ML
LMWH PPP CHRO-ACNC: 0.26 IU/ML
LMWH PPP CHRO-ACNC: 0.32 IU/ML
LMWH PPP CHRO-ACNC: 0.62 IU/ML
LMWH PPP CHRO-ACNC: 0.8 IU/ML
LMWH PPP CHRO-ACNC: 1.48 IU/ML
LMWH PPP CHRO-ACNC: >2 IU/ML
LY30 LYSIS AT 30 MINUTES: 0 % (ref 0–8)
LY30 LYSIS AT 30 MINUTES: 0.9 % (ref 0–8)
LY30 LYSIS AT 30 MINUTES: 3.2 % (ref 0–8)
LY60 LYSIS AT 60 MINUTES: 1.3 % (ref 0–15)
LY60 LYSIS AT 60 MINUTES: 2.7 % (ref 0–15)
LY60 LYSIS AT 60 MINUTES: 7.1 % (ref 0–15)
LYMPHOCYTES # BLD AUTO: 0.6 10E9/L (ref 0.8–5.3)
LYMPHOCYTES # BLD AUTO: 0.9 10E9/L (ref 0.8–5.3)
LYMPHOCYTES NFR BLD AUTO: 10.9 %
LYMPHOCYTES NFR BLD AUTO: 5.9 %
Lab: NORMAL
MA MAXIMUM CLOT STRENGTH: 56.3 MM (ref 50–70)
MA MAXIMUM CLOT STRENGTH: 56.6 MM (ref 50–70)
MA MAXIMUM CLOT STRENGTH: 65.1 MM (ref 50–70)
MAGNESIUM SERPL-MCNC: 1.6 MG/DL (ref 1.6–2.3)
MAGNESIUM SERPL-MCNC: 1.7 MG/DL (ref 1.6–2.3)
MAGNESIUM SERPL-MCNC: 1.8 MG/DL (ref 1.6–2.3)
MAGNESIUM SERPL-MCNC: 1.9 MG/DL (ref 1.6–2.3)
MAGNESIUM SERPL-MCNC: 2.2 MG/DL (ref 1.6–2.3)
MAGNESIUM SERPL-MCNC: 2.2 MG/DL (ref 1.6–2.3)
MAGNESIUM SERPL-MCNC: 2.3 MG/DL (ref 1.6–2.3)
MAGNESIUM SERPL-MCNC: 2.6 MG/DL (ref 1.6–2.3)
MAGNESIUM SERPL-MCNC: 2.6 MG/DL (ref 1.6–2.3)
MAGNESIUM SERPL-MCNC: 2.7 MG/DL (ref 1.6–2.3)
MCH RBC QN AUTO: 30.4 PG (ref 26.5–33)
MCH RBC QN AUTO: 30.8 PG (ref 26.5–33)
MCH RBC QN AUTO: 31.1 PG (ref 26.5–33)
MCH RBC QN AUTO: 31.5 PG (ref 26.5–33)
MCH RBC QN AUTO: 31.8 PG (ref 26.5–33)
MCH RBC QN AUTO: 32 PG (ref 26.5–33)
MCH RBC QN AUTO: 32 PG (ref 26.5–33)
MCH RBC QN AUTO: 32.1 PG (ref 26.5–33)
MCH RBC QN AUTO: 32.2 PG (ref 26.5–33)
MCH RBC QN AUTO: 32.4 PG (ref 26.5–33)
MCHC RBC AUTO-ENTMCNC: 31.8 G/DL (ref 31.5–36.5)
MCHC RBC AUTO-ENTMCNC: 31.9 G/DL (ref 31.5–36.5)
MCHC RBC AUTO-ENTMCNC: 31.9 G/DL (ref 31.5–36.5)
MCHC RBC AUTO-ENTMCNC: 32.6 G/DL (ref 31.5–36.5)
MCHC RBC AUTO-ENTMCNC: 32.6 G/DL (ref 31.5–36.5)
MCHC RBC AUTO-ENTMCNC: 32.7 G/DL (ref 31.5–36.5)
MCHC RBC AUTO-ENTMCNC: 32.8 G/DL (ref 31.5–36.5)
MCHC RBC AUTO-ENTMCNC: 32.8 G/DL (ref 31.5–36.5)
MCHC RBC AUTO-ENTMCNC: 33 G/DL (ref 31.5–36.5)
MCHC RBC AUTO-ENTMCNC: 33.3 G/DL (ref 31.5–36.5)
MCHC RBC AUTO-ENTMCNC: 33.3 G/DL (ref 31.5–36.5)
MCHC RBC AUTO-ENTMCNC: 33.6 G/DL (ref 31.5–36.5)
MCHC RBC AUTO-ENTMCNC: 33.7 G/DL (ref 31.5–36.5)
MCHC RBC AUTO-ENTMCNC: 34 G/DL (ref 31.5–36.5)
MCHC RBC AUTO-ENTMCNC: 34 G/DL (ref 31.5–36.5)
MCV RBC AUTO: 100 FL (ref 78–100)
MCV RBC AUTO: 94 FL (ref 78–100)
MCV RBC AUTO: 95 FL (ref 78–100)
MCV RBC AUTO: 96 FL (ref 78–100)
MCV RBC AUTO: 98 FL (ref 78–100)
MCV RBC AUTO: 99 FL (ref 78–100)
MONOCYTES # BLD AUTO: 0.1 10E9/L (ref 0–1.3)
MONOCYTES # BLD AUTO: 0.3 10E9/L (ref 0–1.3)
MONOCYTES NFR BLD AUTO: 1.4 %
MONOCYTES NFR BLD AUTO: 3.1 %
MRSA DNA SPEC QL NAA+PROBE: NEGATIVE
MUCOUS THREADS #/AREA URNS LPF: PRESENT /LPF
NEUTROPHILS # BLD AUTO: 6.9 10E9/L (ref 1.6–8.3)
NEUTROPHILS # BLD AUTO: 9.5 10E9/L (ref 1.6–8.3)
NEUTROPHILS NFR BLD AUTO: 84.9 %
NEUTROPHILS NFR BLD AUTO: 88.8 %
NITRATE UR QL: NEGATIVE
NRBC # BLD AUTO: 0 10*3/UL
NRBC # BLD AUTO: 0 10*3/UL
NRBC BLD AUTO-RTO: 0 /100
NRBC BLD AUTO-RTO: 0 /100
NUM BPU REQUESTED: 1
NUM BPU REQUESTED: 7
O2/TOTAL GAS SETTING VFR VENT: 100 %
O2/TOTAL GAS SETTING VFR VENT: 1954 %
O2/TOTAL GAS SETTING VFR VENT: 40 %
O2/TOTAL GAS SETTING VFR VENT: 60 %
O2/TOTAL GAS SETTING VFR VENT: 80 %
O2/TOTAL GAS SETTING VFR VENT: NORMAL %
OPIATES UR QL SCN: NEGATIVE
OXYHGB MFR BLD: 66 % (ref 92–100)
OXYHGB MFR BLD: 88 % (ref 92–100)
OXYHGB MFR BLD: 92 % (ref 92–100)
OXYHGB MFR BLD: 95 % (ref 92–100)
OXYHGB MFR BLD: 96 % (ref 92–100)
OXYHGB MFR BLD: 97 % (ref 92–100)
OXYHGB MFR BLD: 98 % (ref 92–100)
OXYHGB MFR BLD: 99 % (ref 92–100)
OXYHGB MFR BLD: 99 % (ref 92–100)
OXYHGB MFR BLD: NORMAL % (ref 92–100)
OXYHGB MFR BLDA: 97 % (ref 75–100)
OXYHGB MFR BLDA: 98 % (ref 75–100)
OXYHGB MFR BLDA: 99 % (ref 75–100)
OXYHGB MFR BLDA: NORMAL % (ref 75–100)
OXYHGB MFR BLDV: 59 %
OXYHGB MFR BLDV: 60 %
OXYHGB MFR BLDV: 60 %
OXYHGB MFR BLDV: 61 %
OXYHGB MFR BLDV: 64 %
OXYHGB MFR BLDV: 65 %
OXYHGB MFR BLDV: 68 %
OXYHGB MFR BLDV: 69 %
OXYHGB MFR BLDV: 76 %
PCO2 BLD: 28 MM HG (ref 35–45)
PCO2 BLD: 32 MM HG (ref 35–45)
PCO2 BLD: 33 MM HG (ref 35–45)
PCO2 BLD: 34 MM HG (ref 35–45)
PCO2 BLD: 35 MM HG (ref 35–45)
PCO2 BLD: 35 MM HG (ref 35–45)
PCO2 BLD: 36 MM HG (ref 35–45)
PCO2 BLD: 37 MM HG (ref 35–45)
PCO2 BLD: 37 MM HG (ref 35–45)
PCO2 BLD: 39 MM HG (ref 35–45)
PCO2 BLD: 40 MM HG (ref 35–45)
PCO2 BLD: 41 MM HG (ref 35–45)
PCO2 BLD: 42 MM HG (ref 35–45)
PCO2 BLD: 43 MM HG (ref 35–45)
PCO2 BLD: 43 MM HG (ref 35–45)
PCO2 BLD: 44 MM HG (ref 35–45)
PCO2 BLD: 52 MM HG (ref 35–45)
PCO2 BLD: 59 MM HG (ref 35–45)
PCO2 BLD: NORMAL MM HG (ref 35–45)
PCO2 BLDA: 42 MM HG (ref 35–45)
PCO2 BLDA: 44 MM HG (ref 35–45)
PCO2 BLDA: 44 MM HG (ref 35–45)
PCO2 BLDA: 48 MM HG (ref 35–45)
PCO2 BLDA: 53 MM HG (ref 35–45)
PCO2 BLDA: 53 MM HG (ref 35–45)
PCO2 BLDA: NORMAL MM HG (ref 35–45)
PCO2 BLDV: 47 MM HG (ref 40–50)
PCO2 BLDV: 48 MM HG (ref 40–50)
PCO2 BLDV: 49 MM HG (ref 40–50)
PCO2 BLDV: 50 MM HG (ref 40–50)
PCO2 BLDV: 51 MM HG (ref 40–50)
PCO2 BLDV: 53 MM HG (ref 40–50)
PCO2 BLDV: 57 MM HG (ref 40–50)
PH BLD: 7.07 PH (ref 7.35–7.45)
PH BLD: 7.23 PH (ref 7.35–7.45)
PH BLD: 7.28 PH (ref 7.35–7.45)
PH BLD: 7.28 PH (ref 7.35–7.45)
PH BLD: 7.3 PH (ref 7.35–7.45)
PH BLD: 7.3 PH (ref 7.35–7.45)
PH BLD: 7.34 PH (ref 7.35–7.45)
PH BLD: 7.36 PH (ref 7.35–7.45)
PH BLD: 7.37 PH (ref 7.35–7.45)
PH BLD: 7.38 PH (ref 7.35–7.45)
PH BLD: 7.39 PH (ref 7.35–7.45)
PH BLD: 7.4 PH (ref 7.35–7.45)
PH BLD: 7.4 PH (ref 7.35–7.45)
PH BLD: 7.41 PH (ref 7.35–7.45)
PH BLD: 7.41 PH (ref 7.35–7.45)
PH BLD: 7.42 PH (ref 7.35–7.45)
PH BLD: 7.42 PH (ref 7.35–7.45)
PH BLD: 7.43 PH (ref 7.35–7.45)
PH BLD: 7.44 PH (ref 7.35–7.45)
PH BLD: 7.44 PH (ref 7.35–7.45)
PH BLD: 7.45 PH (ref 7.35–7.45)
PH BLD: 7.45 PH (ref 7.35–7.45)
PH BLD: 7.46 PH (ref 7.35–7.45)
PH BLD: 7.51 PH (ref 7.35–7.45)
PH BLD: NORMAL PH (ref 7.35–7.45)
PH BLDA: 7.28 PH (ref 7.35–7.45)
PH BLDA: 7.29 PH (ref 7.35–7.45)
PH BLDA: 7.3 PH (ref 7.35–7.45)
PH BLDA: 7.32 PH (ref 7.35–7.45)
PH BLDA: 7.34 PH (ref 7.35–7.45)
PH BLDA: 7.36 PH (ref 7.35–7.45)
PH BLDA: 7.36 PH (ref 7.35–7.45)
PH BLDA: 7.38 PH (ref 7.35–7.45)
PH BLDA: 7.39 PH (ref 7.35–7.45)
PH BLDA: NORMAL PH (ref 7.35–7.45)
PH BLDV: 7.26 PH (ref 7.32–7.43)
PH BLDV: 7.27 PH (ref 7.32–7.43)
PH BLDV: 7.3 PH (ref 7.32–7.43)
PH BLDV: 7.32 PH (ref 7.32–7.43)
PH BLDV: 7.33 PH (ref 7.32–7.43)
PH BLDV: 7.34 PH (ref 7.32–7.43)
PH BLDV: 7.36 PH (ref 7.32–7.43)
PH UR STRIP: 6 PH (ref 5–7)
PHOSPHATE SERPL-MCNC: 4.4 MG/DL (ref 2.5–4.5)
PHOSPHATE SERPL-MCNC: 4.6 MG/DL (ref 2.5–4.5)
PHOSPHATE SERPL-MCNC: 6.6 MG/DL (ref 2.5–4.5)
PLATELET # BLD AUTO: 100 10E9/L (ref 150–450)
PLATELET # BLD AUTO: 110 10E9/L (ref 150–450)
PLATELET # BLD AUTO: 125 10E9/L (ref 150–450)
PLATELET # BLD AUTO: 125 10E9/L (ref 150–450)
PLATELET # BLD AUTO: 155 10E9/L (ref 150–450)
PLATELET # BLD AUTO: 160 10E9/L (ref 150–450)
PLATELET # BLD AUTO: 193 10E9/L (ref 150–450)
PLATELET # BLD AUTO: 210 10E9/L (ref 150–450)
PLATELET # BLD AUTO: 263 10E9/L (ref 150–450)
PLATELET # BLD AUTO: 307 10E9/L (ref 150–450)
PLATELET # BLD AUTO: 325 10E9/L (ref 150–450)
PLATELET # BLD AUTO: 76 10E9/L (ref 150–450)
PLATELET # BLD AUTO: 84 10E9/L (ref 150–450)
PLATELET # BLD AUTO: 94 10E9/L (ref 150–450)
PLATELET # BLD AUTO: 96 10E9/L (ref 150–450)
PLATELET INHIBITION WITH AA: 100 %
PLATELET INHIBITION WITH ADP: 93 %
PO2 BLD: 102 MM HG (ref 80–105)
PO2 BLD: 106 MM HG (ref 80–105)
PO2 BLD: 109 MM HG (ref 80–105)
PO2 BLD: 109 MM HG (ref 80–105)
PO2 BLD: 117 MM HG (ref 80–105)
PO2 BLD: 118 MM HG (ref 80–105)
PO2 BLD: 130 MM HG (ref 80–105)
PO2 BLD: 130 MM HG (ref 80–105)
PO2 BLD: 138 MM HG (ref 80–105)
PO2 BLD: 139 MM HG (ref 80–105)
PO2 BLD: 140 MM HG (ref 80–105)
PO2 BLD: 144 MM HG (ref 80–105)
PO2 BLD: 148 MM HG (ref 80–105)
PO2 BLD: 149 MM HG (ref 80–105)
PO2 BLD: 152 MM HG (ref 80–105)
PO2 BLD: 153 MM HG (ref 80–105)
PO2 BLD: 156 MM HG (ref 80–105)
PO2 BLD: 159 MM HG (ref 80–105)
PO2 BLD: 161 MM HG (ref 80–105)
PO2 BLD: 174 MM HG (ref 80–105)
PO2 BLD: 174 MM HG (ref 80–105)
PO2 BLD: 176 MM HG (ref 80–105)
PO2 BLD: 182 MM HG (ref 80–105)
PO2 BLD: 189 MM HG (ref 80–105)
PO2 BLD: 194 MM HG (ref 80–105)
PO2 BLD: 195 MM HG (ref 80–105)
PO2 BLD: 196 MM HG (ref 80–105)
PO2 BLD: 197 MM HG (ref 80–105)
PO2 BLD: 203 MM HG (ref 80–105)
PO2 BLD: 203 MM HG (ref 80–105)
PO2 BLD: 204 MM HG (ref 80–105)
PO2 BLD: 237 MM HG (ref 80–105)
PO2 BLD: 298 MM HG (ref 80–105)
PO2 BLD: 51 MM HG (ref 80–105)
PO2 BLD: 62 MM HG (ref 80–105)
PO2 BLD: 65 MM HG (ref 80–105)
PO2 BLD: 74 MM HG (ref 80–105)
PO2 BLD: 75 MM HG (ref 80–105)
PO2 BLD: 86 MM HG (ref 80–105)
PO2 BLD: 87 MM HG (ref 80–105)
PO2 BLD: 92 MM HG (ref 80–105)
PO2 BLD: 95 MM HG (ref 80–105)
PO2 BLD: 98 MM HG (ref 80–105)
PO2 BLD: NORMAL MM HG (ref 80–105)
PO2 BLDA: 130 MM HG (ref 80–105)
PO2 BLDA: 196 MM HG (ref 80–105)
PO2 BLDA: 199 MM HG (ref 80–105)
PO2 BLDA: 201 MM HG (ref 80–105)
PO2 BLDA: 218 MM HG (ref 80–105)
PO2 BLDA: 261 MM HG (ref 80–105)
PO2 BLDA: 341 MM HG (ref 80–105)
PO2 BLDA: 342 MM HG (ref 80–105)
PO2 BLDA: 360 MM HG (ref 80–105)
PO2 BLDA: NORMAL MM HG (ref 80–105)
PO2 BLDV: 35 MM HG (ref 25–47)
PO2 BLDV: 36 MM HG (ref 25–47)
PO2 BLDV: 37 MM HG (ref 25–47)
PO2 BLDV: 38 MM HG (ref 25–47)
PO2 BLDV: 39 MM HG (ref 25–47)
PO2 BLDV: 41 MM HG (ref 25–47)
PO2 BLDV: 52 MM HG (ref 25–47)
POTASSIUM BLD-SCNC: 3.5 MMOL/L (ref 3.4–5.3)
POTASSIUM BLD-SCNC: 3.6 MMOL/L (ref 3.4–5.3)
POTASSIUM BLD-SCNC: 3.8 MMOL/L (ref 3.4–5.3)
POTASSIUM SERPL-SCNC: 3.8 MMOL/L (ref 3.4–5.3)
POTASSIUM SERPL-SCNC: 3.8 MMOL/L (ref 3.4–5.3)
POTASSIUM SERPL-SCNC: 3.9 MMOL/L (ref 3.4–5.3)
POTASSIUM SERPL-SCNC: 3.9 MMOL/L (ref 3.4–5.3)
POTASSIUM SERPL-SCNC: 4 MMOL/L (ref 3.4–5.3)
POTASSIUM SERPL-SCNC: 4 MMOL/L (ref 3.4–5.3)
POTASSIUM SERPL-SCNC: 4.2 MMOL/L (ref 3.4–5.3)
POTASSIUM SERPL-SCNC: 4.4 MMOL/L (ref 3.4–5.3)
POTASSIUM SERPL-SCNC: 4.4 MMOL/L (ref 3.4–5.3)
POTASSIUM SERPL-SCNC: 4.5 MMOL/L (ref 3.4–5.3)
POTASSIUM SERPL-SCNC: 4.5 MMOL/L (ref 3.4–5.3)
POTASSIUM SERPL-SCNC: 4.7 MMOL/L (ref 3.4–5.3)
POTASSIUM SERPL-SCNC: 4.7 MMOL/L (ref 3.4–5.3)
PROCALCITONIN SERPL-MCNC: 0.08 NG/ML
PROCALCITONIN SERPL-MCNC: 8.99 NG/ML
PROT SERPL-MCNC: 4.7 G/DL (ref 6.8–8.8)
PROT SERPL-MCNC: 4.8 G/DL (ref 6.8–8.8)
PROT SERPL-MCNC: 4.9 G/DL (ref 6.8–8.8)
PROT SERPL-MCNC: 4.9 G/DL (ref 6.8–8.8)
PROT SERPL-MCNC: 5 G/DL (ref 6.8–8.8)
PROT SERPL-MCNC: 5 G/DL (ref 6.8–8.8)
PROT SERPL-MCNC: 5.2 G/DL (ref 6.8–8.8)
PROT SERPL-MCNC: 5.3 G/DL (ref 6.8–8.8)
PROT SERPL-MCNC: 5.3 G/DL (ref 6.8–8.8)
PROT SERPL-MCNC: 5.4 G/DL (ref 6.8–8.8)
PROT SERPL-MCNC: 5.6 G/DL (ref 6.8–8.8)
PROT SERPL-MCNC: 5.6 G/DL (ref 6.8–8.8)
R TIME UNTIL CLOT FORMS: 7.8 MINUTE (ref 5–10)
R TIME UNTIL CLOT FORMS: 8.6 MINUTE (ref 5–10)
R TIME UNTIL CLOT FORMS: 8.9 MINUTE (ref 5–10)
RBC # BLD AUTO: 2.32 10E12/L (ref 4.4–5.9)
RBC # BLD AUTO: 2.37 10E12/L (ref 4.4–5.9)
RBC # BLD AUTO: 2.46 10E12/L (ref 4.4–5.9)
RBC # BLD AUTO: 2.53 10E12/L (ref 4.4–5.9)
RBC # BLD AUTO: 2.53 10E12/L (ref 4.4–5.9)
RBC # BLD AUTO: 2.54 10E12/L (ref 4.4–5.9)
RBC # BLD AUTO: 2.6 10E12/L (ref 4.4–5.9)
RBC # BLD AUTO: 2.61 10E12/L (ref 4.4–5.9)
RBC # BLD AUTO: 2.65 10E12/L (ref 4.4–5.9)
RBC # BLD AUTO: 2.71 10E12/L (ref 4.4–5.9)
RBC # BLD AUTO: 2.73 10E12/L (ref 4.4–5.9)
RBC # BLD AUTO: 2.81 10E12/L (ref 4.4–5.9)
RBC # BLD AUTO: 3.59 10E12/L (ref 4.4–5.9)
RBC # BLD AUTO: 3.79 10E12/L (ref 4.4–5.9)
RBC # BLD AUTO: 3.8 10E12/L (ref 4.4–5.9)
RBC #/AREA URNS AUTO: 68 /HPF (ref 0–2)
S100 CA BINDING PROTEIN B SER-MCNC: 1177 NG/L (ref 0–96)
S100 CA BINDING PROTEIN B SER-MCNC: 1583 NG/L (ref 0–96)
S100 CA BINDING PROTEIN B SER-MCNC: NORMAL UG/L
SARS-COV-2 PCR COMMENT: NORMAL
SARS-COV-2 RNA SPEC QL NAA+PROBE: NEGATIVE
SARS-COV-2 RNA SPEC QL NAA+PROBE: NORMAL
SODIUM BLD-SCNC: 145 MMOL/L (ref 133–144)
SODIUM BLD-SCNC: 145 MMOL/L (ref 133–144)
SODIUM BLD-SCNC: 146 MMOL/L (ref 133–144)
SODIUM SERPL-SCNC: 144 MMOL/L (ref 133–144)
SODIUM SERPL-SCNC: 144 MMOL/L (ref 133–144)
SODIUM SERPL-SCNC: 145 MMOL/L (ref 133–144)
SODIUM SERPL-SCNC: 146 MMOL/L (ref 133–144)
SODIUM SERPL-SCNC: 147 MMOL/L (ref 133–144)
SODIUM SERPL-SCNC: 149 MMOL/L (ref 133–144)
SODIUM SERPL-SCNC: 149 MMOL/L (ref 133–144)
SODIUM SERPL-SCNC: 150 MMOL/L (ref 133–144)
SODIUM SERPL-SCNC: 151 MMOL/L (ref 133–144)
SODIUM SERPL-SCNC: 152 MMOL/L (ref 133–144)
SOURCE: ABNORMAL
SP GR UR STRIP: 1.03 (ref 1–1.03)
SPECIMEN EXP DATE BLD: NORMAL
SPECIMEN EXP DATE BLD: NORMAL
SPECIMEN SOURCE: NORMAL
SQUAMOUS #/AREA URNS AUTO: 1 /HPF (ref 0–1)
TRANS CELLS #/AREA URNS HPF: 1 /HPF (ref 0–1)
TRANSFUSION STATUS PATIENT QL: NORMAL
TROPONIN I SERPL-MCNC: 125.67 UG/L (ref 0–0.04)
TROPONIN I SERPL-MCNC: 18.18 UG/L (ref 0–0.04)
TROPONIN I SERPL-MCNC: 56.43 UG/L (ref 0–0.04)
TROPONIN I SERPL-MCNC: >200 UG/L (ref 0–0.04)
UROBILINOGEN UR STRIP-MCNC: NORMAL MG/DL (ref 0–2)
VANCOMYCIN SERPL-MCNC: 17.3 MG/L
WBC # BLD AUTO: 10.2 10E9/L (ref 4–11)
WBC # BLD AUTO: 10.3 10E9/L (ref 4–11)
WBC # BLD AUTO: 10.7 10E9/L (ref 4–11)
WBC # BLD AUTO: 11.4 10E9/L (ref 4–11)
WBC # BLD AUTO: 13.4 10E9/L (ref 4–11)
WBC # BLD AUTO: 13.8 10E9/L (ref 4–11)
WBC # BLD AUTO: 14.2 10E9/L (ref 4–11)
WBC # BLD AUTO: 14.2 10E9/L (ref 4–11)
WBC # BLD AUTO: 14.4 10E9/L (ref 4–11)
WBC # BLD AUTO: 15.3 10E9/L (ref 4–11)
WBC # BLD AUTO: 6.7 10E9/L (ref 4–11)
WBC # BLD AUTO: 8.1 10E9/L (ref 4–11)
WBC # BLD AUTO: 9.3 10E9/L (ref 4–11)
WBC # BLD AUTO: 9.7 10E9/L (ref 4–11)
WBC # BLD AUTO: 9.8 10E9/L (ref 4–11)
WBC #/AREA URNS AUTO: 19 /HPF (ref 0–5)

## 2020-01-01 PROCEDURE — C1874 STENT, COATED/COV W/DEL SYS: HCPCS | Performed by: INTERNAL MEDICINE

## 2020-01-01 PROCEDURE — 80053 COMPREHEN METABOLIC PANEL: CPT | Performed by: STUDENT IN AN ORGANIZED HEALTH CARE EDUCATION/TRAINING PROGRAM

## 2020-01-01 PROCEDURE — 40000275 ZZH STATISTIC RCP TIME EA 10 MIN

## 2020-01-01 PROCEDURE — 93454 CORONARY ARTERY ANGIO S&I: CPT | Performed by: INTERNAL MEDICINE

## 2020-01-01 PROCEDURE — 86900 BLOOD TYPING SEROLOGIC ABO: CPT | Performed by: PHYSICIAN ASSISTANT

## 2020-01-01 PROCEDURE — 33947 ECMO/ECLS INITIATION ARTERY: CPT | Performed by: INTERNAL MEDICINE

## 2020-01-01 PROCEDURE — 93321 DOPPLER ECHO F-UP/LMTD STD: CPT | Mod: 26 | Performed by: INTERNAL MEDICINE

## 2020-01-01 PROCEDURE — 82805 BLOOD GASES W/O2 SATURATION: CPT | Performed by: STUDENT IN AN ORGANIZED HEALTH CARE EDUCATION/TRAINING PROGRAM

## 2020-01-01 PROCEDURE — 85396 CLOTTING ASSAY WHOLE BLOOD: CPT | Performed by: STUDENT IN AN ORGANIZED HEALTH CARE EDUCATION/TRAINING PROGRAM

## 2020-01-01 PROCEDURE — 83051 HEMOGLOBIN PLASMA: CPT | Performed by: STUDENT IN AN ORGANIZED HEALTH CARE EDUCATION/TRAINING PROGRAM

## 2020-01-01 PROCEDURE — 25000128 H RX IP 250 OP 636: Performed by: PHYSICIAN ASSISTANT

## 2020-01-01 PROCEDURE — 82805 BLOOD GASES W/O2 SATURATION: CPT | Performed by: INTERNAL MEDICINE

## 2020-01-01 PROCEDURE — 83615 LACTATE (LD) (LDH) ENZYME: CPT | Performed by: STUDENT IN AN ORGANIZED HEALTH CARE EDUCATION/TRAINING PROGRAM

## 2020-01-01 PROCEDURE — 83735 ASSAY OF MAGNESIUM: CPT | Performed by: STUDENT IN AN ORGANIZED HEALTH CARE EDUCATION/TRAINING PROGRAM

## 2020-01-01 PROCEDURE — 27211184 ZZH CARDIOHELP CIRCUIT

## 2020-01-01 PROCEDURE — 25000128 H RX IP 250 OP 636

## 2020-01-01 PROCEDURE — 37799 UNLISTED PX VASCULAR SURGERY: CPT | Performed by: INTERNAL MEDICINE

## 2020-01-01 PROCEDURE — 83605 ASSAY OF LACTIC ACID: CPT | Performed by: INTERNAL MEDICINE

## 2020-01-01 PROCEDURE — 84132 ASSAY OF SERUM POTASSIUM: CPT

## 2020-01-01 PROCEDURE — 99291 CRITICAL CARE FIRST HOUR: CPT | Mod: 25 | Performed by: INTERNAL MEDICINE

## 2020-01-01 PROCEDURE — 84484 ASSAY OF TROPONIN QUANT: CPT | Performed by: STUDENT IN AN ORGANIZED HEALTH CARE EDUCATION/TRAINING PROGRAM

## 2020-01-01 PROCEDURE — 40000986 XR ABDOMEN PORT 1 VW

## 2020-01-01 PROCEDURE — 82947 ASSAY GLUCOSE BLOOD QUANT: CPT | Performed by: STUDENT IN AN ORGANIZED HEALTH CARE EDUCATION/TRAINING PROGRAM

## 2020-01-01 PROCEDURE — 25000125 ZZHC RX 250: Performed by: INTERNAL MEDICINE

## 2020-01-01 PROCEDURE — C9113 INJ PANTOPRAZOLE SODIUM, VIA: HCPCS | Performed by: STUDENT IN AN ORGANIZED HEALTH CARE EDUCATION/TRAINING PROGRAM

## 2020-01-01 PROCEDURE — 71045 X-RAY EXAM CHEST 1 VIEW: CPT

## 2020-01-01 PROCEDURE — 20000004 ZZH R&B ICU UMMC

## 2020-01-01 PROCEDURE — 95715 VEEG EA 12-26HR INTMT MNTR: CPT | Mod: ZF

## 2020-01-01 PROCEDURE — 82803 BLOOD GASES ANY COMBINATION: CPT

## 2020-01-01 PROCEDURE — 25000125 ZZHC RX 250: Performed by: STUDENT IN AN ORGANIZED HEALTH CARE EDUCATION/TRAINING PROGRAM

## 2020-01-01 PROCEDURE — 83051 HEMOGLOBIN PLASMA: CPT | Performed by: INTERNAL MEDICINE

## 2020-01-01 PROCEDURE — 86140 C-REACTIVE PROTEIN: CPT | Performed by: INTERNAL MEDICINE

## 2020-01-01 PROCEDURE — 87641 MR-STAPH DNA AMP PROBE: CPT | Performed by: INTERNAL MEDICINE

## 2020-01-01 PROCEDURE — 85379 FIBRIN DEGRADATION QUANT: CPT | Performed by: INTERNAL MEDICINE

## 2020-01-01 PROCEDURE — 82330 ASSAY OF CALCIUM: CPT | Performed by: STUDENT IN AN ORGANIZED HEALTH CARE EDUCATION/TRAINING PROGRAM

## 2020-01-01 PROCEDURE — 40000081 ZZH STATISTIC INSERT IABP

## 2020-01-01 PROCEDURE — 84484 ASSAY OF TROPONIN QUANT: CPT | Performed by: INTERNAL MEDICINE

## 2020-01-01 PROCEDURE — P9016 RBC LEUKOCYTES REDUCED: HCPCS | Performed by: PHYSICIAN ASSISTANT

## 2020-01-01 PROCEDURE — 00000146 ZZHCL STATISTIC GLUCOSE BY METER IP

## 2020-01-01 PROCEDURE — 5A1522G EXTRACORPOREAL OXYGENATION, MEMBRANE, PERIPHERAL VENO-ARTERIAL: ICD-10-PCS | Performed by: INTERNAL MEDICINE

## 2020-01-01 PROCEDURE — 86316 IMMUNOASSAY TUMOR OTHER: CPT | Performed by: STUDENT IN AN ORGANIZED HEALTH CARE EDUCATION/TRAINING PROGRAM

## 2020-01-01 PROCEDURE — 25000125 ZZHC RX 250: Performed by: NURSE PRACTITIONER

## 2020-01-01 PROCEDURE — 85347 COAGULATION TIME ACTIVATED: CPT

## 2020-01-01 PROCEDURE — 93010 ELECTROCARDIOGRAM REPORT: CPT | Mod: 59 | Performed by: INTERNAL MEDICINE

## 2020-01-01 PROCEDURE — 83735 ASSAY OF MAGNESIUM: CPT | Performed by: INTERNAL MEDICINE

## 2020-01-01 PROCEDURE — 36620 INSERTION CATHETER ARTERY: CPT | Mod: 59 | Performed by: INTERNAL MEDICINE

## 2020-01-01 PROCEDURE — 84145 PROCALCITONIN (PCT): CPT | Performed by: STUDENT IN AN ORGANIZED HEALTH CARE EDUCATION/TRAINING PROGRAM

## 2020-01-01 PROCEDURE — P9041 ALBUMIN (HUMAN),5%, 50ML: HCPCS | Performed by: STUDENT IN AN ORGANIZED HEALTH CARE EDUCATION/TRAINING PROGRAM

## 2020-01-01 PROCEDURE — 93308 TTE F-UP OR LMTD: CPT

## 2020-01-01 PROCEDURE — 85652 RBC SED RATE AUTOMATED: CPT | Performed by: INTERNAL MEDICINE

## 2020-01-01 PROCEDURE — 93925 LOWER EXTREMITY STUDY: CPT

## 2020-01-01 PROCEDURE — 84100 ASSAY OF PHOSPHORUS: CPT | Performed by: INTERNAL MEDICINE

## 2020-01-01 PROCEDURE — C1887 CATHETER, GUIDING: HCPCS | Performed by: INTERNAL MEDICINE

## 2020-01-01 PROCEDURE — 93325 DOPPLER ECHO COLOR FLOW MAPG: CPT | Mod: 26 | Performed by: INTERNAL MEDICINE

## 2020-01-01 PROCEDURE — 25000128 H RX IP 250 OP 636: Performed by: STUDENT IN AN ORGANIZED HEALTH CARE EDUCATION/TRAINING PROGRAM

## 2020-01-01 PROCEDURE — 25800030 ZZH RX IP 258 OP 636: Performed by: STUDENT IN AN ORGANIZED HEALTH CARE EDUCATION/TRAINING PROGRAM

## 2020-01-01 PROCEDURE — 85730 THROMBOPLASTIN TIME PARTIAL: CPT | Performed by: INTERNAL MEDICINE

## 2020-01-01 PROCEDURE — 93005 ELECTROCARDIOGRAM TRACING: CPT

## 2020-01-01 PROCEDURE — 82810 BLOOD GASES O2 SAT ONLY: CPT

## 2020-01-01 PROCEDURE — 70450 CT HEAD/BRAIN W/O DYE: CPT

## 2020-01-01 PROCEDURE — 40000076 ZZH STATISTIC IABP MONITORING

## 2020-01-01 PROCEDURE — 85300 ANTITHROMBIN III ACTIVITY: CPT | Performed by: INTERNAL MEDICINE

## 2020-01-01 PROCEDURE — 40000281 ZZH STATISTIC TRANSPORT TIME EA 15 MIN

## 2020-01-01 PROCEDURE — 94003 VENT MGMT INPAT SUBQ DAY: CPT

## 2020-01-01 PROCEDURE — 87070 CULTURE OTHR SPECIMN AEROBIC: CPT | Performed by: STUDENT IN AN ORGANIZED HEALTH CARE EDUCATION/TRAINING PROGRAM

## 2020-01-01 PROCEDURE — 82330 ASSAY OF CALCIUM: CPT

## 2020-01-01 PROCEDURE — C9460 INJECTION, CANGRELOR: HCPCS | Performed by: INTERNAL MEDICINE

## 2020-01-01 PROCEDURE — 25000128 H RX IP 250 OP 636: Performed by: INTERNAL MEDICINE

## 2020-01-01 PROCEDURE — 25000132 ZZH RX MED GY IP 250 OP 250 PS 637: Performed by: STUDENT IN AN ORGANIZED HEALTH CARE EDUCATION/TRAINING PROGRAM

## 2020-01-01 PROCEDURE — 84145 PROCALCITONIN (PCT): CPT | Performed by: INTERNAL MEDICINE

## 2020-01-01 PROCEDURE — 85610 PROTHROMBIN TIME: CPT | Performed by: STUDENT IN AN ORGANIZED HEALTH CARE EDUCATION/TRAINING PROGRAM

## 2020-01-01 PROCEDURE — 33952 ECMO/ECLS INSJ PRPH CANNULA: CPT | Performed by: INTERNAL MEDICINE

## 2020-01-01 PROCEDURE — 03HB33Z INSERTION OF INFUSION DEVICE INTO RIGHT RADIAL ARTERY, PERCUTANEOUS APPROACH: ICD-10-PCS | Performed by: INTERNAL MEDICINE

## 2020-01-01 PROCEDURE — P9047 ALBUMIN (HUMAN), 25%, 50ML: HCPCS | Performed by: INTERNAL MEDICINE

## 2020-01-01 PROCEDURE — 82947 ASSAY GLUCOSE BLOOD QUANT: CPT

## 2020-01-01 PROCEDURE — 25800030 ZZH RX IP 258 OP 636

## 2020-01-01 PROCEDURE — B2111ZZ FLUOROSCOPY OF MULTIPLE CORONARY ARTERIES USING LOW OSMOLAR CONTRAST: ICD-10-PCS | Performed by: INTERNAL MEDICINE

## 2020-01-01 PROCEDURE — C1894 INTRO/SHEATH, NON-LASER: HCPCS | Performed by: INTERNAL MEDICINE

## 2020-01-01 PROCEDURE — 92950 HEART/LUNG RESUSCITATION CPR: CPT | Performed by: INTERNAL MEDICINE

## 2020-01-01 PROCEDURE — 85027 COMPLETE CBC AUTOMATED: CPT | Performed by: INTERNAL MEDICINE

## 2020-01-01 PROCEDURE — 95711 VEEG 2-12 HR UNMONITORED: CPT | Mod: ZF

## 2020-01-01 PROCEDURE — 27210794 ZZH OR GENERAL SUPPLY STERILE: Performed by: INTERNAL MEDICINE

## 2020-01-01 PROCEDURE — 44500 INTRO GASTROINTESTINAL TUBE: CPT

## 2020-01-01 PROCEDURE — 80053 COMPREHEN METABOLIC PANEL: CPT | Performed by: INTERNAL MEDICINE

## 2020-01-01 PROCEDURE — 36415 COLL VENOUS BLD VENIPUNCTURE: CPT | Performed by: STUDENT IN AN ORGANIZED HEALTH CARE EDUCATION/TRAINING PROGRAM

## 2020-01-01 PROCEDURE — 86900 BLOOD TYPING SEROLOGIC ABO: CPT | Performed by: STUDENT IN AN ORGANIZED HEALTH CARE EDUCATION/TRAINING PROGRAM

## 2020-01-01 PROCEDURE — 40000014 ZZH STATISTIC ARTERIAL MONITORING DAILY

## 2020-01-01 PROCEDURE — 27210305 ZZH CATH BALLOON IABP

## 2020-01-01 PROCEDURE — 27210299 ZZH CANNULA, ARTERIAL FEMORAL

## 2020-01-01 PROCEDURE — 86316 IMMUNOASSAY TUMOR OTHER: CPT | Performed by: INTERNAL MEDICINE

## 2020-01-01 PROCEDURE — 87205 SMEAR GRAM STAIN: CPT | Performed by: STUDENT IN AN ORGANIZED HEALTH CARE EDUCATION/TRAINING PROGRAM

## 2020-01-01 PROCEDURE — 80307 DRUG TEST PRSMV CHEM ANLYZR: CPT | Performed by: STUDENT IN AN ORGANIZED HEALTH CARE EDUCATION/TRAINING PROGRAM

## 2020-01-01 PROCEDURE — 84484 ASSAY OF TROPONIN QUANT: CPT | Performed by: PHYSICIAN ASSISTANT

## 2020-01-01 PROCEDURE — 80053 COMPREHEN METABOLIC PANEL: CPT | Performed by: PHYSICIAN ASSISTANT

## 2020-01-01 PROCEDURE — 85379 FIBRIN DEGRADATION QUANT: CPT | Performed by: STUDENT IN AN ORGANIZED HEALTH CARE EDUCATION/TRAINING PROGRAM

## 2020-01-01 PROCEDURE — 36415 COLL VENOUS BLD VENIPUNCTURE: CPT | Performed by: INTERNAL MEDICINE

## 2020-01-01 PROCEDURE — C1769 GUIDE WIRE: HCPCS | Performed by: INTERNAL MEDICINE

## 2020-01-01 PROCEDURE — 80202 ASSAY OF VANCOMYCIN: CPT | Performed by: INTERNAL MEDICINE

## 2020-01-01 PROCEDURE — 85610 PROTHROMBIN TIME: CPT | Performed by: PHYSICIAN ASSISTANT

## 2020-01-01 PROCEDURE — 06HN33Z INSERTION OF INFUSION DEVICE INTO LEFT FEMORAL VEIN, PERCUTANEOUS APPROACH: ICD-10-PCS | Performed by: INTERNAL MEDICINE

## 2020-01-01 PROCEDURE — 25800030 ZZH RX IP 258 OP 636: Performed by: INTERNAL MEDICINE

## 2020-01-01 PROCEDURE — 83615 LACTATE (LD) (LDH) ENZYME: CPT | Performed by: INTERNAL MEDICINE

## 2020-01-01 PROCEDURE — 99292 CRITICAL CARE ADDL 30 MIN: CPT | Mod: 25 | Performed by: INTERNAL MEDICINE

## 2020-01-01 PROCEDURE — 85520 HEPARIN ASSAY: CPT | Performed by: INTERNAL MEDICINE

## 2020-01-01 PROCEDURE — 25000132 ZZH RX MED GY IP 250 OP 250 PS 637

## 2020-01-01 PROCEDURE — 5A1945Z RESPIRATORY VENTILATION, 24-96 CONSECUTIVE HOURS: ICD-10-PCS | Performed by: INTERNAL MEDICINE

## 2020-01-01 PROCEDURE — 33949 ECMO/ECLS DAILY MGMT ARTERY: CPT

## 2020-01-01 PROCEDURE — 86850 RBC ANTIBODY SCREEN: CPT | Performed by: STUDENT IN AN ORGANIZED HEALTH CARE EDUCATION/TRAINING PROGRAM

## 2020-01-01 PROCEDURE — 87040 BLOOD CULTURE FOR BACTERIA: CPT | Performed by: INTERNAL MEDICINE

## 2020-01-01 PROCEDURE — 86923 COMPATIBILITY TEST ELECTRIC: CPT | Performed by: PHYSICIAN ASSISTANT

## 2020-01-01 PROCEDURE — 87086 URINE CULTURE/COLONY COUNT: CPT | Performed by: INTERNAL MEDICINE

## 2020-01-01 PROCEDURE — 87040 BLOOD CULTURE FOR BACTERIA: CPT | Performed by: STUDENT IN AN ORGANIZED HEALTH CARE EDUCATION/TRAINING PROGRAM

## 2020-01-01 PROCEDURE — 027034Z DILATION OF CORONARY ARTERY, ONE ARTERY WITH DRUG-ELUTING INTRALUMINAL DEVICE, PERCUTANEOUS APPROACH: ICD-10-PCS | Performed by: INTERNAL MEDICINE

## 2020-01-01 PROCEDURE — 85730 THROMBOPLASTIN TIME PARTIAL: CPT | Performed by: PHYSICIAN ASSISTANT

## 2020-01-01 PROCEDURE — 85027 COMPLETE CBC AUTOMATED: CPT | Performed by: STUDENT IN AN ORGANIZED HEALTH CARE EDUCATION/TRAINING PROGRAM

## 2020-01-01 PROCEDURE — 85520 HEPARIN ASSAY: CPT | Performed by: STUDENT IN AN ORGANIZED HEALTH CARE EDUCATION/TRAINING PROGRAM

## 2020-01-01 PROCEDURE — 93321 DOPPLER ECHO F-UP/LMTD STD: CPT

## 2020-01-01 PROCEDURE — 85610 PROTHROMBIN TIME: CPT | Performed by: INTERNAL MEDICINE

## 2020-01-01 PROCEDURE — C9600 PERC DRUG-EL COR STENT SING: HCPCS | Performed by: INTERNAL MEDICINE

## 2020-01-01 PROCEDURE — 92928 PRQ TCAT PLMT NTRAC ST 1 LES: CPT | Mod: LD | Performed by: INTERNAL MEDICINE

## 2020-01-01 PROCEDURE — 95700 EEG CONT REC W/VID EEG TECH: CPT | Mod: ZF

## 2020-01-01 PROCEDURE — 86850 RBC ANTIBODY SCREEN: CPT | Performed by: PHYSICIAN ASSISTANT

## 2020-01-01 PROCEDURE — 25000128 H RX IP 250 OP 636: Performed by: NURSE PRACTITIONER

## 2020-01-01 PROCEDURE — 83605 ASSAY OF LACTIC ACID: CPT | Performed by: STUDENT IN AN ORGANIZED HEALTH CARE EDUCATION/TRAINING PROGRAM

## 2020-01-01 PROCEDURE — 33967 INSERT I-AORT PERCUT DEVICE: CPT | Mod: 59 | Performed by: INTERNAL MEDICINE

## 2020-01-01 PROCEDURE — 27211402 ZZH SENSOR NIRS OXIMETER, ADULT

## 2020-01-01 PROCEDURE — 33949 ECMO/ECLS DAILY MGMT ARTERY: CPT | Performed by: INTERNAL MEDICINE

## 2020-01-01 PROCEDURE — 83036 HEMOGLOBIN GLYCOSYLATED A1C: CPT | Performed by: STUDENT IN AN ORGANIZED HEALTH CARE EDUCATION/TRAINING PROGRAM

## 2020-01-01 PROCEDURE — 85379 FIBRIN DEGRADATION QUANT: CPT | Performed by: PHYSICIAN ASSISTANT

## 2020-01-01 PROCEDURE — 25500064 ZZH RX 255 OP 636: Performed by: INTERNAL MEDICINE

## 2020-01-01 PROCEDURE — 74176 CT ABD & PELVIS W/O CONTRAST: CPT

## 2020-01-01 PROCEDURE — 94002 VENT MGMT INPAT INIT DAY: CPT

## 2020-01-01 PROCEDURE — 27210437 ZZH NUTRITION PRODUCT SEMIELEM INTERMED LITER

## 2020-01-01 PROCEDURE — 83735 ASSAY OF MAGNESIUM: CPT | Performed by: PHYSICIAN ASSISTANT

## 2020-01-01 PROCEDURE — 40000986 XR CHEST PORT 1 VW

## 2020-01-01 PROCEDURE — 86923 COMPATIBILITY TEST ELECTRIC: CPT | Performed by: STUDENT IN AN ORGANIZED HEALTH CARE EDUCATION/TRAINING PROGRAM

## 2020-01-01 PROCEDURE — 93454 CORONARY ARTERY ANGIO S&I: CPT | Mod: 26 | Performed by: INTERNAL MEDICINE

## 2020-01-01 PROCEDURE — 82330 ASSAY OF CALCIUM: CPT | Performed by: INTERNAL MEDICINE

## 2020-01-01 PROCEDURE — 86901 BLOOD TYPING SEROLOGIC RH(D): CPT | Performed by: PHYSICIAN ASSISTANT

## 2020-01-01 PROCEDURE — 85520 HEPARIN ASSAY: CPT | Performed by: PHYSICIAN ASSISTANT

## 2020-01-01 PROCEDURE — 85384 FIBRINOGEN ACTIVITY: CPT | Performed by: STUDENT IN AN ORGANIZED HEALTH CARE EDUCATION/TRAINING PROGRAM

## 2020-01-01 PROCEDURE — C1751 CATH, INF, PER/CENT/MIDLINE: HCPCS | Performed by: INTERNAL MEDICINE

## 2020-01-01 PROCEDURE — 25000132 ZZH RX MED GY IP 250 OP 250 PS 637: Performed by: INTERNAL MEDICINE

## 2020-01-01 PROCEDURE — C1725 CATH, TRANSLUMIN NON-LASER: HCPCS | Performed by: INTERNAL MEDICINE

## 2020-01-01 PROCEDURE — G0463 HOSPITAL OUTPT CLINIC VISIT: HCPCS

## 2020-01-01 PROCEDURE — 85384 FIBRINOGEN ACTIVITY: CPT | Performed by: INTERNAL MEDICINE

## 2020-01-01 PROCEDURE — 25800030 ZZH RX IP 258 OP 636: Performed by: NURSE PRACTITIONER

## 2020-01-01 PROCEDURE — 27211119 ZZH ARTERIAL CANNULA 15-17 FRENCH: Performed by: INTERNAL MEDICINE

## 2020-01-01 PROCEDURE — 93308 TTE F-UP OR LMTD: CPT | Mod: 26 | Performed by: INTERNAL MEDICINE

## 2020-01-01 PROCEDURE — C9460 INJECTION, CANGRELOR: HCPCS

## 2020-01-01 PROCEDURE — 87635 SARS-COV-2 COVID-19 AMP PRB: CPT | Performed by: STUDENT IN AN ORGANIZED HEALTH CARE EDUCATION/TRAINING PROGRAM

## 2020-01-01 PROCEDURE — 81001 URINALYSIS AUTO W/SCOPE: CPT | Performed by: STUDENT IN AN ORGANIZED HEALTH CARE EDUCATION/TRAINING PROGRAM

## 2020-01-01 PROCEDURE — 5A02210 ASSISTANCE WITH CARDIAC OUTPUT USING BALLOON PUMP, CONTINUOUS: ICD-10-PCS | Performed by: INTERNAL MEDICINE

## 2020-01-01 PROCEDURE — 80354 DRUG SCREENING FENTANYL: CPT | Performed by: STUDENT IN AN ORGANIZED HEALTH CARE EDUCATION/TRAINING PROGRAM

## 2020-01-01 PROCEDURE — 82947 ASSAY GLUCOSE BLOOD QUANT: CPT | Performed by: PHYSICIAN ASSISTANT

## 2020-01-01 PROCEDURE — 85652 RBC SED RATE AUTOMATED: CPT | Performed by: STUDENT IN AN ORGANIZED HEALTH CARE EDUCATION/TRAINING PROGRAM

## 2020-01-01 PROCEDURE — 93880 EXTRACRANIAL BILAT STUDY: CPT

## 2020-01-01 PROCEDURE — 80320 DRUG SCREEN QUANTALCOHOLS: CPT | Performed by: STUDENT IN AN ORGANIZED HEALTH CARE EDUCATION/TRAINING PROGRAM

## 2020-01-01 PROCEDURE — 86901 BLOOD TYPING SEROLOGIC RH(D): CPT | Performed by: STUDENT IN AN ORGANIZED HEALTH CARE EDUCATION/TRAINING PROGRAM

## 2020-01-01 PROCEDURE — 82947 ASSAY GLUCOSE BLOOD QUANT: CPT | Performed by: INTERNAL MEDICINE

## 2020-01-01 PROCEDURE — 33947 ECMO/ECLS INITIATION ARTERY: CPT

## 2020-01-01 PROCEDURE — 41000018 ZZH PER-PERFUSION 1ST 30 MIN: Performed by: INTERNAL MEDICINE

## 2020-01-01 PROCEDURE — 93010 ELECTROCARDIOGRAM REPORT: CPT | Performed by: INTERNAL MEDICINE

## 2020-01-01 PROCEDURE — 82533 TOTAL CORTISOL: CPT | Performed by: STUDENT IN AN ORGANIZED HEALTH CARE EDUCATION/TRAINING PROGRAM

## 2020-01-01 PROCEDURE — 92960 CARDIOVERSION ELECTRIC EXT: CPT | Performed by: INTERNAL MEDICINE

## 2020-01-01 PROCEDURE — 95712 VEEG 2-12 HR INTMT MNTR: CPT | Mod: ZF

## 2020-01-01 PROCEDURE — 27211332 ZZ H CANNULA, VENOUS FEMORAL, ADULT

## 2020-01-01 PROCEDURE — 99239 HOSP IP/OBS DSCHRG MGMT >30: CPT | Mod: 25 | Performed by: INTERNAL MEDICINE

## 2020-01-01 PROCEDURE — 83605 ASSAY OF LACTIC ACID: CPT

## 2020-01-01 PROCEDURE — 80347 BENZODIAZEPINES 13 OR MORE: CPT | Performed by: STUDENT IN AN ORGANIZED HEALTH CARE EDUCATION/TRAINING PROGRAM

## 2020-01-01 PROCEDURE — 3E043XZ INTRODUCTION OF VASOPRESSOR INTO CENTRAL VEIN, PERCUTANEOUS APPROACH: ICD-10-PCS | Performed by: INTERNAL MEDICINE

## 2020-01-01 PROCEDURE — 99221 1ST HOSP IP/OBS SF/LOW 40: CPT | Performed by: NURSE PRACTITIONER

## 2020-01-01 PROCEDURE — 85730 THROMBOPLASTIN TIME PARTIAL: CPT | Performed by: STUDENT IN AN ORGANIZED HEALTH CARE EDUCATION/TRAINING PROGRAM

## 2020-01-01 PROCEDURE — 86140 C-REACTIVE PROTEIN: CPT | Performed by: STUDENT IN AN ORGANIZED HEALTH CARE EDUCATION/TRAINING PROGRAM

## 2020-01-01 PROCEDURE — 87640 STAPH A DNA AMP PROBE: CPT | Performed by: INTERNAL MEDICINE

## 2020-01-01 PROCEDURE — 84295 ASSAY OF SERUM SODIUM: CPT

## 2020-01-01 PROCEDURE — 83605 ASSAY OF LACTIC ACID: CPT | Performed by: PHYSICIAN ASSISTANT

## 2020-01-01 PROCEDURE — 85025 COMPLETE CBC W/AUTO DIFF WBC: CPT | Performed by: PHYSICIAN ASSISTANT

## 2020-01-01 PROCEDURE — 33967 INSERT I-AORT PERCUT DEVICE: CPT | Performed by: INTERNAL MEDICINE

## 2020-01-01 PROCEDURE — 85025 COMPLETE CBC W/AUTO DIFF WBC: CPT | Performed by: STUDENT IN AN ORGANIZED HEALTH CARE EDUCATION/TRAINING PROGRAM

## 2020-01-01 PROCEDURE — 85300 ANTITHROMBIN III ACTIVITY: CPT | Performed by: PHYSICIAN ASSISTANT

## 2020-01-01 PROCEDURE — 36620 INSERTION CATHETER ARTERY: CPT | Performed by: INTERNAL MEDICINE

## 2020-01-01 PROCEDURE — 82330 ASSAY OF CALCIUM: CPT | Performed by: PHYSICIAN ASSISTANT

## 2020-01-01 DEVICE — STENT SYNERGY DRUG ELUTING 3.00X28MM  H7493926028300: Type: IMPLANTABLE DEVICE | Status: FUNCTIONAL

## 2020-01-01 RX ORDER — SODIUM CHLORIDE, SODIUM LACTATE, POTASSIUM CHLORIDE, CALCIUM CHLORIDE 600; 310; 30; 20 MG/100ML; MG/100ML; MG/100ML; MG/100ML
INJECTION, SOLUTION INTRAVENOUS
Status: DISCONTINUED
Start: 2020-01-01 | End: 2020-01-01 | Stop reason: HOSPADM

## 2020-01-01 RX ORDER — ALBUMIN (HUMAN) 12.5 G/50ML
12.5 SOLUTION INTRAVENOUS
Status: DISCONTINUED | OUTPATIENT
Start: 2020-01-01 | End: 2020-04-15 | Stop reason: HOSPADM

## 2020-01-01 RX ORDER — DIGOXIN 0.25 MG/ML
500 INJECTION INTRAMUSCULAR; INTRAVENOUS EVERY 6 HOURS
Status: DISCONTINUED | OUTPATIENT
Start: 2020-01-01 | End: 2020-01-01

## 2020-01-01 RX ORDER — MIDAZOLAM (PF) 1 MG/ML IN 0.9 % SODIUM CHLORIDE INTRAVENOUS SOLUTION
1-8 CONTINUOUS
Status: DISCONTINUED | OUTPATIENT
Start: 2020-01-01 | End: 2020-01-01

## 2020-01-01 RX ORDER — ALBUTEROL SULFATE 90 UG/1
6 AEROSOL, METERED RESPIRATORY (INHALATION) EVERY 4 HOURS PRN
Status: DISCONTINUED | OUTPATIENT
Start: 2020-01-01 | End: 2020-04-15 | Stop reason: HOSPADM

## 2020-01-01 RX ORDER — NOREPINEPHRINE BITARTRATE 0.06 MG/ML
.03-.4 INJECTION, SOLUTION INTRAVENOUS CONTINUOUS PRN
Status: DISCONTINUED | OUTPATIENT
Start: 2020-01-01 | End: 2020-01-01

## 2020-01-01 RX ORDER — HEPARIN SODIUM 10000 [USP'U]/100ML
10-50 INJECTION, SOLUTION INTRAVENOUS CONTINUOUS
Status: DISCONTINUED | OUTPATIENT
Start: 2020-01-01 | End: 2020-04-15 | Stop reason: HOSPADM

## 2020-01-01 RX ORDER — DEXTROSE MONOHYDRATE 100 MG/ML
INJECTION, SOLUTION INTRAVENOUS CONTINUOUS PRN
Status: DISCONTINUED | OUTPATIENT
Start: 2020-01-01 | End: 2020-04-15 | Stop reason: HOSPADM

## 2020-01-01 RX ORDER — FOLIC ACID 5 MG/ML
1 INJECTION, SOLUTION INTRAMUSCULAR; INTRAVENOUS; SUBCUTANEOUS DAILY
Status: DISCONTINUED | OUTPATIENT
Start: 2020-01-01 | End: 2020-04-15 | Stop reason: HOSPADM

## 2020-01-01 RX ORDER — CALCIUM CHLORIDE 100 MG/ML
1 INJECTION INTRAVENOUS; INTRAVENTRICULAR EVERY 10 MIN PRN
Status: DISCONTINUED | OUTPATIENT
Start: 2020-01-01 | End: 2020-04-15 | Stop reason: HOSPADM

## 2020-01-01 RX ORDER — PROPOFOL 10 MG/ML
5-75 INJECTION, EMULSION INTRAVENOUS CONTINUOUS
Status: DISCONTINUED | OUTPATIENT
Start: 2020-01-01 | End: 2020-01-01

## 2020-01-01 RX ORDER — ARGATROBAN 1 MG/ML
150 INJECTION, SOLUTION INTRAVENOUS
Status: DISCONTINUED | OUTPATIENT
Start: 2020-01-01 | End: 2020-01-01 | Stop reason: HOSPADM

## 2020-01-01 RX ORDER — PROPOFOL 10 MG/ML
5-75 INJECTION, EMULSION INTRAVENOUS CONTINUOUS
Status: DISCONTINUED | OUTPATIENT
Start: 2020-01-01 | End: 2020-04-15 | Stop reason: HOSPADM

## 2020-01-01 RX ORDER — FOLIC ACID 1 MG/1
1 TABLET ORAL DAILY
Status: DISCONTINUED | OUTPATIENT
Start: 2020-04-16 | End: 2020-04-15 | Stop reason: HOSPADM

## 2020-01-01 RX ORDER — ASPIRIN 325 MG
TABLET ORAL
Status: DISCONTINUED | OUTPATIENT
Start: 2020-01-01 | End: 2020-01-01 | Stop reason: HOSPADM

## 2020-01-01 RX ORDER — LIDOCAINE 40 MG/G
CREAM TOPICAL
Status: DISCONTINUED | OUTPATIENT
Start: 2020-01-01 | End: 2020-04-15 | Stop reason: HOSPADM

## 2020-01-01 RX ORDER — NICOTINE POLACRILEX 4 MG
15-30 LOZENGE BUCCAL
Status: DISCONTINUED | OUTPATIENT
Start: 2020-01-01 | End: 2020-04-15 | Stop reason: HOSPADM

## 2020-01-01 RX ORDER — PIPERACILLIN SODIUM, TAZOBACTAM SODIUM 2; .25 G/10ML; G/10ML
2.25 INJECTION, POWDER, LYOPHILIZED, FOR SOLUTION INTRAVENOUS EVERY 6 HOURS
Status: DISCONTINUED | OUTPATIENT
Start: 2020-01-01 | End: 2020-01-01

## 2020-01-01 RX ORDER — LIDOCAINE HYDROCHLORIDE 20 MG/ML
5 SOLUTION OROPHARYNGEAL ONCE
Status: DISCONTINUED | OUTPATIENT
Start: 2020-01-01 | End: 2020-04-15 | Stop reason: HOSPADM

## 2020-01-01 RX ORDER — BISACODYL 10 MG
10 SUPPOSITORY, RECTAL RECTAL DAILY PRN
Status: DISCONTINUED | OUTPATIENT
Start: 2020-01-01 | End: 2020-04-15 | Stop reason: HOSPADM

## 2020-01-01 RX ORDER — ALBUMIN, HUMAN INJ 5% 5 %
25 SOLUTION INTRAVENOUS ONCE
Status: DISCONTINUED | OUTPATIENT
Start: 2020-01-01 | End: 2020-04-15 | Stop reason: HOSPADM

## 2020-01-01 RX ORDER — NALOXONE HYDROCHLORIDE 0.4 MG/ML
.1-.4 INJECTION, SOLUTION INTRAMUSCULAR; INTRAVENOUS; SUBCUTANEOUS
Status: DISCONTINUED | OUTPATIENT
Start: 2020-01-01 | End: 2020-04-15 | Stop reason: HOSPADM

## 2020-01-01 RX ORDER — LANOLIN ALCOHOL/MO/W.PET/CERES
100 CREAM (GRAM) TOPICAL 3 TIMES DAILY
Status: DISCONTINUED | OUTPATIENT
Start: 2020-04-15 | End: 2020-04-15 | Stop reason: HOSPADM

## 2020-01-01 RX ORDER — MAGNESIUM SULFATE HEPTAHYDRATE 40 MG/ML
4 INJECTION, SOLUTION INTRAVENOUS EVERY 4 HOURS PRN
Status: DISCONTINUED | OUTPATIENT
Start: 2020-01-01 | End: 2020-04-15 | Stop reason: HOSPADM

## 2020-01-01 RX ORDER — MAGNESIUM SULFATE HEPTAHYDRATE 40 MG/ML
2 INJECTION, SOLUTION INTRAVENOUS DAILY PRN
Status: DISCONTINUED | OUTPATIENT
Start: 2020-01-01 | End: 2020-04-15 | Stop reason: HOSPADM

## 2020-01-01 RX ORDER — DIGOXIN 0.25 MG/ML
500 INJECTION INTRAMUSCULAR; INTRAVENOUS ONCE
Status: COMPLETED | OUTPATIENT
Start: 2020-01-01 | End: 2020-01-01

## 2020-01-01 RX ORDER — HEPARIN SODIUM (PORCINE) LOCK FLUSH IV SOLN 100 UNIT/ML 100 UNIT/ML
5-10 SOLUTION INTRAVENOUS EVERY 30 MIN PRN
Status: DISCONTINUED | OUTPATIENT
Start: 2020-01-01 | End: 2020-01-01

## 2020-01-01 RX ORDER — LANOLIN ALCOHOL/MO/W.PET/CERES
100 CREAM (GRAM) TOPICAL 3 TIMES DAILY
Status: DISCONTINUED | OUTPATIENT
Start: 2020-04-15 | End: 2020-01-01

## 2020-01-01 RX ORDER — PIPERACILLIN SODIUM, TAZOBACTAM SODIUM 3; .375 G/15ML; G/15ML
3.38 INJECTION, POWDER, LYOPHILIZED, FOR SOLUTION INTRAVENOUS EVERY 6 HOURS
Status: DISCONTINUED | OUTPATIENT
Start: 2020-01-01 | End: 2020-01-01

## 2020-01-01 RX ORDER — OXYMETAZOLINE HYDROCHLORIDE 0.05 G/100ML
2 SPRAY NASAL 2 TIMES DAILY
Status: DISCONTINUED | OUTPATIENT
Start: 2020-01-01 | End: 2020-04-15 | Stop reason: HOSPADM

## 2020-01-01 RX ORDER — ALBUMIN, HUMAN INJ 5% 5 %
SOLUTION INTRAVENOUS
Status: DISCONTINUED
Start: 2020-01-01 | End: 2020-01-01 | Stop reason: HOSPADM

## 2020-01-01 RX ORDER — MULTIPLE VITAMINS W/ MINERALS TAB 9MG-400MCG
1 TAB ORAL DAILY
Status: DISCONTINUED | OUTPATIENT
Start: 2020-01-01 | End: 2020-01-01

## 2020-01-01 RX ORDER — EPINEPHRINE IN SOD CHLOR,ISO 1 MG/10 ML
SYRINGE (ML) INTRAVENOUS
Status: DISCONTINUED | OUTPATIENT
Start: 2020-01-01 | End: 2020-01-01 | Stop reason: HOSPADM

## 2020-01-01 RX ORDER — FOLIC ACID 1 MG/1
1 TABLET ORAL DAILY
Status: DISCONTINUED | OUTPATIENT
Start: 2020-04-16 | End: 2020-01-01

## 2020-01-01 RX ORDER — LIDOCAINE HYDROCHLORIDE ANHYDROUS AND DEXTROSE MONOHYDRATE .8; 5 G/100ML; G/100ML
1-4 INJECTION, SOLUTION INTRAVENOUS CONTINUOUS
Status: DISCONTINUED | OUTPATIENT
Start: 2020-01-01 | End: 2020-01-01 | Stop reason: HOSPADM

## 2020-01-01 RX ORDER — HEPARIN SODIUM 10000 [USP'U]/100ML
100-1000 INJECTION, SOLUTION INTRAVENOUS CONTINUOUS PRN
Status: DISCONTINUED | OUTPATIENT
Start: 2020-01-01 | End: 2020-01-01 | Stop reason: HOSPADM

## 2020-01-01 RX ORDER — NOREPINEPHRINE BITARTRATE 0.06 MG/ML
.03-.4 INJECTION, SOLUTION INTRAVENOUS CONTINUOUS
Status: DISCONTINUED | OUTPATIENT
Start: 2020-01-01 | End: 2020-01-01

## 2020-01-01 RX ORDER — FLUMAZENIL 0.1 MG/ML
0.2 INJECTION, SOLUTION INTRAVENOUS
Status: DISCONTINUED | OUTPATIENT
Start: 2020-01-01 | End: 2020-04-15 | Stop reason: HOSPADM

## 2020-01-01 RX ORDER — ASPIRIN 81 MG/1
81 TABLET, CHEWABLE ORAL DAILY
Status: DISCONTINUED | OUTPATIENT
Start: 2020-01-01 | End: 2020-04-15 | Stop reason: HOSPADM

## 2020-01-01 RX ORDER — DEXTROSE MONOHYDRATE 25 G/50ML
25-50 INJECTION, SOLUTION INTRAVENOUS
Status: DISCONTINUED | OUTPATIENT
Start: 2020-01-01 | End: 2020-04-15 | Stop reason: HOSPADM

## 2020-01-01 RX ORDER — HEPARIN SODIUM 10000 [USP'U]/100ML
10-50 INJECTION, SOLUTION INTRAVENOUS CONTINUOUS
Status: DISCONTINUED | OUTPATIENT
Start: 2020-01-01 | End: 2020-01-01

## 2020-01-01 RX ORDER — ARGATROBAN 1 MG/ML
350 INJECTION, SOLUTION INTRAVENOUS
Status: DISCONTINUED | OUTPATIENT
Start: 2020-01-01 | End: 2020-01-01 | Stop reason: HOSPADM

## 2020-01-01 RX ORDER — FOLIC ACID 5 MG/ML
1 INJECTION, SOLUTION INTRAMUSCULAR; INTRAVENOUS; SUBCUTANEOUS ONCE
Status: COMPLETED | OUTPATIENT
Start: 2020-01-01 | End: 2020-01-01

## 2020-01-01 RX ORDER — LANOLIN ALCOHOL/MO/W.PET/CERES
100 CREAM (GRAM) TOPICAL DAILY
Status: DISCONTINUED | OUTPATIENT
Start: 2020-04-20 | End: 2020-04-15 | Stop reason: HOSPADM

## 2020-01-01 RX ORDER — VASOPRESSIN 20 U/ML
INJECTION PARENTERAL
Status: DISCONTINUED | OUTPATIENT
Start: 2020-01-01 | End: 2020-01-01 | Stop reason: HOSPADM

## 2020-01-01 RX ORDER — PROPOFOL 10 MG/ML
INJECTION, EMULSION INTRAVENOUS
Status: COMPLETED
Start: 2020-01-01 | End: 2020-01-01

## 2020-01-01 RX ORDER — HEPARIN SODIUM 1000 [USP'U]/ML
INJECTION, SOLUTION INTRAVENOUS; SUBCUTANEOUS
Status: DISCONTINUED | OUTPATIENT
Start: 2020-01-01 | End: 2020-01-01 | Stop reason: HOSPADM

## 2020-01-01 RX ORDER — DOCUSATE SODIUM 100 MG/1
100 CAPSULE, LIQUID FILLED ORAL 2 TIMES DAILY
Status: DISCONTINUED | OUTPATIENT
Start: 2020-01-01 | End: 2020-01-01

## 2020-01-01 RX ORDER — ALBUMIN, HUMAN INJ 5% 5 %
25 SOLUTION INTRAVENOUS
Status: DISCONTINUED | OUTPATIENT
Start: 2020-01-01 | End: 2020-04-15 | Stop reason: HOSPADM

## 2020-01-01 RX ORDER — NOREPINEPHRINE BITARTRATE 0.06 MG/ML
INJECTION, SOLUTION INTRAVENOUS CONTINUOUS PRN
Status: DISCONTINUED | OUTPATIENT
Start: 2020-01-01 | End: 2020-01-01 | Stop reason: HOSPADM

## 2020-01-01 RX ORDER — LANOLIN ALCOHOL/MO/W.PET/CERES
100 CREAM (GRAM) TOPICAL DAILY
Status: DISCONTINUED | OUTPATIENT
Start: 2020-04-20 | End: 2020-01-01

## 2020-01-01 RX ORDER — MIDAZOLAM (PF) 1 MG/ML IN 0.9 % SODIUM CHLORIDE INTRAVENOUS SOLUTION
1-8 CONTINUOUS
Status: DISCONTINUED | OUTPATIENT
Start: 2020-01-01 | End: 2020-04-15 | Stop reason: HOSPADM

## 2020-01-01 RX ORDER — POTASSIUM CHLORIDE 29.8 MG/ML
20 INJECTION INTRAVENOUS
Status: DISCONTINUED | OUTPATIENT
Start: 2020-01-01 | End: 2020-04-15 | Stop reason: HOSPADM

## 2020-01-01 RX ADMIN — Medication: at 19:50

## 2020-01-01 RX ADMIN — PROPOFOL 20 MCG/KG/MIN: 10 INJECTION, EMULSION INTRAVENOUS at 19:23

## 2020-01-01 RX ADMIN — Medication 100 MCG/HR: at 14:49

## 2020-01-01 RX ADMIN — MIDAZOLAM (PF) 1 MG/ML IN 0.9 % SODIUM CHLORIDE INTRAVENOUS SOLUTION 8 MG/HR: at 23:54

## 2020-01-01 RX ADMIN — Medication 75 MCG/HR: at 14:35

## 2020-01-01 RX ADMIN — PANTOPRAZOLE SODIUM 40 MG: 40 INJECTION, POWDER, FOR SOLUTION INTRAVENOUS at 08:18

## 2020-01-01 RX ADMIN — TICAGRELOR 90 MG: 90 TABLET ORAL at 20:11

## 2020-01-01 RX ADMIN — PROPOFOL 50 MCG/KG/MIN: 10 INJECTION, EMULSION INTRAVENOUS at 11:45

## 2020-01-01 RX ADMIN — EPINEPHRINE 0.2 MCG: 0.1 INJECTION, SOLUTION ENDOTRACHEAL; INTRACARDIAC; INTRAVENOUS at 11:51

## 2020-01-01 RX ADMIN — EPINEPHRINE 0.1 MCG: 0.1 INJECTION, SOLUTION ENDOTRACHEAL; INTRACARDIAC; INTRAVENOUS at 11:44

## 2020-01-01 RX ADMIN — PIPERACILLIN AND TAZOBACTAM 3.38 G: 3; .375 INJECTION, POWDER, FOR SOLUTION INTRAVENOUS at 10:16

## 2020-01-01 RX ADMIN — OXYMETAZOLINE HYDROCHLORIDE 2 SPRAY: 0.05 SPRAY NASAL at 19:32

## 2020-01-01 RX ADMIN — OXYMETAZOLINE HYDROCHLORIDE 2 SPRAY: 0.05 SPRAY NASAL at 08:18

## 2020-01-01 RX ADMIN — HEPARIN SODIUM 10000 UNITS: 1000 INJECTION INTRAVENOUS; SUBCUTANEOUS at 11:35

## 2020-01-01 RX ADMIN — HUMAN ALBUMIN MICROSPHERES AND PERFLUTREN 6 ML: 10; .22 INJECTION, SOLUTION INTRAVENOUS at 11:00

## 2020-01-01 RX ADMIN — VASOPRESSIN 4 UNITS/HR: 20 INJECTION INTRAVENOUS at 11:58

## 2020-01-01 RX ADMIN — SODIUM CHLORIDE, POTASSIUM CHLORIDE, SODIUM LACTATE AND CALCIUM CHLORIDE 1000 ML: 600; 310; 30; 20 INJECTION, SOLUTION INTRAVENOUS at 15:15

## 2020-01-01 RX ADMIN — ALBUMIN HUMAN 12.5 G: 0.25 SOLUTION INTRAVENOUS at 03:20

## 2020-01-01 RX ADMIN — CANGRELOR 4 MCG/KG/MIN: 50 INJECTION, POWDER, LYOPHILIZED, FOR SOLUTION INTRAVENOUS at 15:09

## 2020-01-01 RX ADMIN — HEPARIN SODIUM (PORCINE) LOCK FLUSH IV SOLN 100 UNIT/ML 730 UNITS: 100 SOLUTION at 18:09

## 2020-01-01 RX ADMIN — ASPIRIN 325 MG ORAL TABLET 325 MG: 325 PILL ORAL at 12:35

## 2020-01-01 RX ADMIN — MAGNESIUM SULFATE IN WATER 2 G: 40 INJECTION, SOLUTION INTRAVENOUS at 22:43

## 2020-01-01 RX ADMIN — THIAMINE HYDROCHLORIDE 200 MG: 100 INJECTION, SOLUTION INTRAMUSCULAR; INTRAVENOUS at 20:03

## 2020-01-01 RX ADMIN — DOCUSATE SODIUM 100 MG: 50 LIQUID ORAL at 08:21

## 2020-01-01 RX ADMIN — Medication 1 G: at 12:58

## 2020-01-01 RX ADMIN — ASPIRIN 81 MG CHEWABLE TABLET 81 MG: 81 TABLET CHEWABLE at 08:23

## 2020-01-01 RX ADMIN — THIAMINE HYDROCHLORIDE 200 MG: 100 INJECTION, SOLUTION INTRAMUSCULAR; INTRAVENOUS at 08:52

## 2020-01-01 RX ADMIN — TICAGRELOR 90 MG: 90 TABLET ORAL at 08:21

## 2020-01-01 RX ADMIN — MIDAZOLAM (PF) 1 MG/ML IN 0.9 % SODIUM CHLORIDE INTRAVENOUS SOLUTION 8 MG/HR: at 23:04

## 2020-01-01 RX ADMIN — SODIUM CHLORIDE, POTASSIUM CHLORIDE, SODIUM LACTATE AND CALCIUM CHLORIDE 1000 ML: 600; 310; 30; 20 INJECTION, SOLUTION INTRAVENOUS at 13:28

## 2020-01-01 RX ADMIN — HEPARIN SODIUM 700 UNITS/HR: 10000 INJECTION, SOLUTION INTRAVENOUS at 21:34

## 2020-01-01 RX ADMIN — TICAGRELOR 90 MG: 90 TABLET ORAL at 20:03

## 2020-01-01 RX ADMIN — CEFEPIME HYDROCHLORIDE 2 G: 2 INJECTION, POWDER, FOR SOLUTION INTRAVENOUS at 11:28

## 2020-01-01 RX ADMIN — PROPOFOL 20 MCG/KG/MIN: 10 INJECTION, EMULSION INTRAVENOUS at 14:10

## 2020-01-01 RX ADMIN — HEPARIN SODIUM 750 UNITS/HR: 10000 INJECTION, SOLUTION INTRAVENOUS at 18:08

## 2020-01-01 RX ADMIN — ALBUMIN HUMAN 25 G: 0.05 INJECTION, SOLUTION INTRAVENOUS at 14:30

## 2020-01-01 RX ADMIN — MIDAZOLAM (PF) 1 MG/ML IN 0.9 % SODIUM CHLORIDE INTRAVENOUS SOLUTION 2 MG/HR: at 00:38

## 2020-01-01 RX ADMIN — ALBUMIN HUMAN 25 G: 0.05 INJECTION, SOLUTION INTRAVENOUS at 20:20

## 2020-01-01 RX ADMIN — EPINEPHRINE 0.03 MCG/KG/MIN: 1 INJECTION PARENTERAL at 10:28

## 2020-01-01 RX ADMIN — CEFEPIME HYDROCHLORIDE 2 G: 2 INJECTION, POWDER, FOR SOLUTION INTRAVENOUS at 23:50

## 2020-01-01 RX ADMIN — HEPARIN SODIUM 3 ML/HR: 1000 INJECTION, SOLUTION INTRAVENOUS; SUBCUTANEOUS at 15:19

## 2020-01-01 RX ADMIN — ASPIRIN 81 MG CHEWABLE TABLET 81 MG: 81 TABLET CHEWABLE at 08:21

## 2020-01-01 RX ADMIN — ALBUMIN HUMAN 25 G: 0.05 INJECTION, SOLUTION INTRAVENOUS at 02:50

## 2020-01-01 RX ADMIN — HUMAN INSULIN 2 UNITS/HR: 100 INJECTION, SOLUTION SUBCUTANEOUS at 20:31

## 2020-01-01 RX ADMIN — VASOPRESSIN 4 UNITS: 20 INJECTION INTRAVENOUS at 11:52

## 2020-01-01 RX ADMIN — HEPARIN SODIUM (PORCINE) LOCK FLUSH IV SOLN 100 UNIT/ML 730 UNITS: 100 SOLUTION at 20:03

## 2020-01-01 RX ADMIN — OXYMETAZOLINE HYDROCHLORIDE 2 SPRAY: 0.05 SPRAY NASAL at 08:44

## 2020-01-01 RX ADMIN — EPINEPHRINE 0.3 MCG/KG/MIN: 1 INJECTION PARENTERAL at 11:51

## 2020-01-01 RX ADMIN — HEPARIN SODIUM 900 UNITS/HR: 10000 INJECTION, SOLUTION INTRAVENOUS at 05:46

## 2020-01-01 RX ADMIN — Medication 1 G: at 01:18

## 2020-01-01 RX ADMIN — PIPERACILLIN AND TAZOBACTAM 3.38 G: 3; .375 INJECTION, POWDER, FOR SOLUTION INTRAVENOUS at 23:57

## 2020-01-01 RX ADMIN — OXYMETAZOLINE HYDROCHLORIDE 2 SPRAY: 0.05 SPRAY NASAL at 20:11

## 2020-01-01 RX ADMIN — PIPERACILLIN AND TAZOBACTAM 3.38 G: 3; .375 INJECTION, POWDER, FOR SOLUTION INTRAVENOUS at 17:52

## 2020-01-01 RX ADMIN — Medication: at 03:40

## 2020-01-01 RX ADMIN — FOLIC ACID 1 MG: 5 INJECTION, SOLUTION INTRAMUSCULAR; INTRAVENOUS; SUBCUTANEOUS at 10:38

## 2020-01-01 RX ADMIN — HUMAN INSULIN 1 UNITS/HR: 100 INJECTION, SOLUTION SUBCUTANEOUS at 14:01

## 2020-01-01 RX ADMIN — Medication 5000 UNITS: at 12:48

## 2020-01-01 RX ADMIN — Medication: at 03:25

## 2020-01-01 RX ADMIN — OXYMETAZOLINE HYDROCHLORIDE 2 SPRAY: 0.05 SPRAY NASAL at 08:22

## 2020-01-01 RX ADMIN — CEFEPIME HYDROCHLORIDE 2 G: 2 INJECTION, POWDER, FOR SOLUTION INTRAVENOUS at 12:47

## 2020-01-01 RX ADMIN — TICAGRELOR 90 MG: 90 TABLET ORAL at 19:49

## 2020-01-01 RX ADMIN — SODIUM CHLORIDE, POTASSIUM CHLORIDE, SODIUM LACTATE AND CALCIUM CHLORIDE 1000 ML: 600; 310; 30; 20 INJECTION, SOLUTION INTRAVENOUS at 16:40

## 2020-01-01 RX ADMIN — PANTOPRAZOLE SODIUM 40 MG: 40 INJECTION, POWDER, FOR SOLUTION INTRAVENOUS at 08:22

## 2020-01-01 RX ADMIN — PROPOFOL 20 MCG/KG/MIN: 10 INJECTION, EMULSION INTRAVENOUS at 03:25

## 2020-01-01 RX ADMIN — DOCUSATE SODIUM 100 MG: 50 LIQUID ORAL at 19:37

## 2020-01-01 RX ADMIN — MULTIVITAMIN 15 ML: LIQUID ORAL at 08:21

## 2020-01-01 RX ADMIN — VANCOMYCIN HYDROCHLORIDE 1750 MG: 1 INJECTION, POWDER, LYOPHILIZED, FOR SOLUTION INTRAVENOUS at 04:54

## 2020-01-01 RX ADMIN — EPINEPHRINE 0.01 MCG/KG/MIN: 1 INJECTION PARENTERAL at 08:38

## 2020-01-01 RX ADMIN — FOLIC ACID 1 MG: 5 INJECTION, SOLUTION INTRAMUSCULAR; INTRAVENOUS; SUBCUTANEOUS at 12:58

## 2020-01-01 RX ADMIN — TICAGRELOR 180 MG: 90 TABLET ORAL at 12:35

## 2020-01-01 RX ADMIN — TICAGRELOR 90 MG: 90 TABLET ORAL at 08:23

## 2020-01-01 RX ADMIN — Medication 1 G: at 10:40

## 2020-01-01 RX ADMIN — DIGOXIN 500 MCG: 0.25 INJECTION INTRAMUSCULAR; INTRAVENOUS at 10:59

## 2020-01-01 RX ADMIN — TICAGRELOR 90 MG: 90 TABLET ORAL at 19:36

## 2020-01-01 RX ADMIN — Medication 1 G: at 05:59

## 2020-01-01 RX ADMIN — PANTOPRAZOLE SODIUM 40 MG: 40 INJECTION, POWDER, FOR SOLUTION INTRAVENOUS at 08:23

## 2020-01-01 RX ADMIN — THIAMINE HYDROCHLORIDE 200 MG: 100 INJECTION, SOLUTION INTRAMUSCULAR; INTRAVENOUS at 15:02

## 2020-01-01 RX ADMIN — OXYMETAZOLINE HYDROCHLORIDE 2 SPRAY: 0.05 SPRAY NASAL at 19:50

## 2020-01-01 RX ADMIN — Medication 100 MCG/HR: at 14:06

## 2020-01-01 RX ADMIN — PIPERACILLIN AND TAZOBACTAM 3.38 G: 3; .375 INJECTION, POWDER, FOR SOLUTION INTRAVENOUS at 23:03

## 2020-01-01 RX ADMIN — PIPERACILLIN AND TAZOBACTAM 3.38 G: 3; .375 INJECTION, POWDER, FOR SOLUTION INTRAVENOUS at 05:52

## 2020-01-01 RX ADMIN — THIAMINE HYDROCHLORIDE 200 MG: 100 INJECTION, SOLUTION INTRAMUSCULAR; INTRAVENOUS at 19:36

## 2020-01-01 RX ADMIN — VANCOMYCIN HYDROCHLORIDE 1750 MG: 10 INJECTION, POWDER, LYOPHILIZED, FOR SOLUTION INTRAVENOUS at 11:10

## 2020-01-01 RX ADMIN — MIDAZOLAM (PF) 1 MG/ML IN 0.9 % SODIUM CHLORIDE INTRAVENOUS SOLUTION 8 MG/HR: at 12:26

## 2020-01-01 RX ADMIN — VANCOMYCIN HYDROCHLORIDE 2000 MG: 1 INJECTION, POWDER, LYOPHILIZED, FOR SOLUTION INTRAVENOUS at 16:32

## 2020-01-01 RX ADMIN — MIDAZOLAM (PF) 1 MG/ML IN 0.9 % SODIUM CHLORIDE INTRAVENOUS SOLUTION 4 MG/HR: at 15:08

## 2020-01-01 RX ADMIN — ALBUMIN HUMAN 25 G: 0.05 INJECTION, SOLUTION INTRAVENOUS at 16:43

## 2020-01-01 RX ADMIN — PANTOPRAZOLE SODIUM 40 MG: 40 INJECTION, POWDER, FOR SOLUTION INTRAVENOUS at 16:54

## 2020-01-01 RX ADMIN — HUMAN INSULIN 2 UNITS/HR: 100 INJECTION, SOLUTION SUBCUTANEOUS at 08:19

## 2020-01-01 RX ADMIN — PIPERACILLIN AND TAZOBACTAM 3.38 G: 3; .375 INJECTION, POWDER, FOR SOLUTION INTRAVENOUS at 04:41

## 2020-01-01 RX ADMIN — PIPERACILLIN AND TAZOBACTAM 3.38 G: 3; .375 INJECTION, POWDER, FOR SOLUTION INTRAVENOUS at 17:05

## 2020-01-01 RX ADMIN — PROPOFOL 30 MCG/KG/MIN: 10 INJECTION, EMULSION INTRAVENOUS at 17:01

## 2020-01-01 RX ADMIN — SODIUM BICARBONATE 100 MEQ: 84 INJECTION, SOLUTION INTRAVENOUS at 11:44

## 2020-01-01 RX ADMIN — ALBUMIN HUMAN 25 G: 0.05 INJECTION, SOLUTION INTRAVENOUS at 20:04

## 2020-01-01 RX ADMIN — Medication 0 MCG/KG/MIN: at 11:53

## 2020-01-01 RX ADMIN — CANGRELOR 4 MCG/KG/MIN: 50 INJECTION, POWDER, LYOPHILIZED, FOR SOLUTION INTRAVENOUS at 12:05

## 2020-01-01 RX ADMIN — HEPARIN SODIUM 5000 UNITS: 1000 INJECTION INTRAVENOUS; SUBCUTANEOUS at 11:59

## 2020-01-01 RX ADMIN — EPINEPHRINE 0.2 MCG: 0.1 INJECTION, SOLUTION ENDOTRACHEAL; INTRACARDIAC; INTRAVENOUS at 11:40

## 2020-01-01 RX ADMIN — VANCOMYCIN HYDROCHLORIDE 1500 MG: 10 INJECTION, POWDER, LYOPHILIZED, FOR SOLUTION INTRAVENOUS at 05:13

## 2020-01-01 RX ADMIN — THIAMINE HYDROCHLORIDE 200 MG: 100 INJECTION, SOLUTION INTRAMUSCULAR; INTRAVENOUS at 15:21

## 2020-01-01 ASSESSMENT — ACTIVITIES OF DAILY LIVING (ADL)
ADLS_ACUITY_SCORE: 26
ADLS_ACUITY_SCORE: 20
ADLS_ACUITY_SCORE: 22
ADLS_ACUITY_SCORE: 18
ADLS_ACUITY_SCORE: 18
ADLS_ACUITY_SCORE: 22
ADLS_ACUITY_SCORE: 26
ADLS_ACUITY_SCORE: 19
ADLS_ACUITY_SCORE: 20
ADLS_ACUITY_SCORE: 18
ADLS_ACUITY_SCORE: 22
ADLS_ACUITY_SCORE: 22
ADLS_ACUITY_SCORE: 26
ADLS_ACUITY_SCORE: 26
ADLS_ACUITY_SCORE: 20
ADLS_ACUITY_SCORE: 22
ADLS_ACUITY_SCORE: 26
ADLS_ACUITY_SCORE: 26

## 2020-01-01 ASSESSMENT — MIFFLIN-ST. JEOR
SCORE: 1732.25
SCORE: 1424.25
SCORE: 1425.26
SCORE: 1428.25

## 2020-04-11 PROBLEM — I46.9 CARDIAC ARREST (H): Status: ACTIVE | Noted: 2020-01-01

## 2020-04-11 NOTE — PROGRESS NOTES
ECLS Cannulation Note:    Date on: 4/11/2020  Time on: 1125  Surgeon: Oneil    Arterial Cannula: 17 Fr. In the Right Femoral Artery      Venous Cannula: 25 Fr. In the Right Femoral Vein      ECMO components include CardioHelp Circuit Lot # [id number]   Oxygenator Lot Number: [id number]   Console Serial Number: [id number]    Cannulation was performed in the Cath Lab, placement was verified by fluoroscopy, cannulas are in good position.  ISTAT at bedside.     Fernando Bhat, RT, RRT  4/11/2020 3:03 PM

## 2020-04-11 NOTE — Clinical Note
The first balloon was inserted into the left anterior descending and proximal left anterior descending.Max pressure = 12 chilo. Total duration = 5 seconds.

## 2020-04-11 NOTE — PHARMACY-VANCOMYCIN DOSING SERVICE
Pharmacy Vancomycin Initial Note  Date of Service 2020  Patient's  1900  89 year old, male    Indication: Aspiration Pneumonia    Current estimated CrCl = Estimated Creatinine Clearance: 19.5 mL/min (based on SCr of 1.14 mg/dL).    Creatinine for last 3 days  2020:  1:00 PM Creatinine 1.14 mg/dL;  2:36 PM Creatinine 1.14 mg/dL    Recent Vancomycin Level(s) for last 3 days  No results found for requested labs within last 72 hours.      Vancomycin IV Administrations (past 72 hours)                   vancomycin (VANCOCIN) 2,000 mg in sodium chloride 0.9 % 500 mL intermittent infusion (mg) 2,000 mg New Bag 20 1632                Nephrotoxins and other renal medications (From now, onward)    Start     Dose/Rate Route Frequency Ordered Stop    20 0500  vancomycin 1500 mg in 0.9% NaCl 250 ml intermittent infusion 1,500 mg      1,500 mg  over 90 Minutes Intravenous EVERY 12 HOURS 20 1632      20 1500  piperacillin-tazobactam (ZOSYN) 3.375 g vial to attach to  mL bag      3.375 g  over 30 Minutes Intravenous EVERY 6 HOURS 20 1246 20 1459    20 1430  vancomycin (VANCOCIN) 2,000 mg in sodium chloride 0.9 % 500 mL intermittent infusion      2,000 mg  over 2 Hours Intravenous ONCE 20 1429      20 1300  phenylephrine (FRENCH-SYNEPHRINE) 50 mg in sodium chloride 0.9 % 250 mL infusion      0.5-6 mcg/kg/min × 90.9 kg (Dosing Weight)  13.6-163.6 mL/hr  Intravenous CONTINUOUS 20 1246      20 1300  vasopressin (VASOSTRICT) 40 Units in sodium chloride 0.9 % 40 mL infusion      0.5-4 Units/hr  0.5-4 mL/hr  Intravenous CONTINUOUS 20 1246      20 1300  norepinephrine (LEVOPHED) 16 mg in  mL infusion      0.03-0.4 mcg/kg/min × 90.9 kg (Dosing Weight)  2.6-34.1 mL/hr  Intravenous CONTINUOUS 20 1246      20 1246  norepinephrine (LEVOPHED) 16 mg in  mL infusion      0.03-0.4 mcg/kg/min × 90.9 kg (Dosing  Weight)  2.6-34.1 mL/hr  Intravenous CONTINUOUS PRN 04/11/20 1246            Contrast Orders - past 72 hours (72h ago, onward)    None                Plan:  1.  Start vancomycin  2000 mg IV once, then 1500mg IV q12h (16.5mg/kg actBW, 18.7mg/kg adjBW).   2.  Goal Trough Level: 15-20 mg/L   3.  Pharmacy will check trough levels as appropriate in 1-3 Days.    4. Serum creatinine levels will be ordered daily for the first week of therapy and at least twice weekly for subsequent weeks.    5. Ben Wheeler method utilized to dose vancomycin therapy: Method 2    Aide [facility]

## 2020-04-11 NOTE — Clinical Note
The first balloon was inserted into the left anterior descending and proximal left anterior descending.Max pressure = 16 chilo. Total duration = 6 seconds.     Max pressure = 16 chilo. Total duration = 6 seconds.    Balloon reinflated a second time: Max pressure = 16 chilo. Total duration = 6 seconds.  Balloon reinflated a third time: Max pressure = 16 chilo. Total duration = 4 seconds.

## 2020-04-11 NOTE — Clinical Note
The first balloon was inserted into the left anterior descending and proximal left anterior descending.Max pressure = 12 chilo. Total duration = 5 seconds.     Max pressure = 12 chilo. Total duration = 5 seconds.    Balloon reinflated a second time: Max pressure = 12 chilo. Total duration = 5 seconds.

## 2020-04-11 NOTE — Clinical Note
The first balloon was inserted into the left anterior descending and proximal left anterior descending.Max pressure = 14 chilo. Total duration = 6 seconds.     Max pressure = 12 chilo. Total duration = 5 seconds.    Balloon reinflated a second time: Max pressure = 12 chilo. Total duration = 5 seconds.

## 2020-04-11 NOTE — PROGRESS NOTES
Paged for VTVF  Witnessed arrest with bystander CPR.  Patient arrived with CPR via [place]. ETCO2 in the mid 30's.  Monitor showing VF.  Placed on table, monitors applied and labs drawn.  Right femoral 17Fr arterial and 25 FR venous cannulas placed by Dr Riggs.  10,000 units heparin to circuit- no IV access available.  After visually confirming correct arterial and venous circuit to cannula connections were free of air, ECMO initiated at 1135.  Subsequent . Given 5000u heparin for that result.   Pateint out of Vf about 1214.   PCI done. IABP, thermoguard catheter and 6Fr distal perfusion catheter placed.  Alarms on and limits set. SVO2 calibrated.  A line being placed. Report to JERALD Anne at 1245.

## 2020-04-11 NOTE — PROGRESS NOTES
Trial: Advanced REperfusion STrategies for Refractory Cardiac Arrest (The ARREST Trial)    Continued Participation Consent after EFIC    IRB Number: [id number]    PI: Natalio Damon [phone]    Primary Study Coordinators:  Ameena Hodge RN (p) [phone] or Blaire Short RN (p) [phone]  Web info: [email or web address]    Participant enrolled int [facility] ECMO arm of the above study.  Enrollment done with exception of informed consent (EFIC) under FDA regulations.  Per guidelines family/LAR will be notified of enrollment as soon as feasible.  If questions please call a member of the research.     Gumaro, son, was notified of his fathers enrollment into the trial.  I explained the trial and his enrollment under EFIC.  A consent was emailed to him for review.  He is driving here tonight and tomorrow.  I will plan to meet with him on Monday to obtain written consent.

## 2020-04-11 NOTE — H&P
CARDIOLOGY-CSI HISTORY AND PHYSICAL NOTE  Odilia Gusman MRN:[id number] YOB: 1900  Date of Admission:4/11/2020    ASSESSMENT AND PLAN:   Patient Dino Bundy is a 55 y/o M with PMH of HTN, EtOH use who presented as VF cardiac arrest. Patient went to his girlfriend's house this morning (he biked there) and they decided to sit on the porch. While she was making coffee she heard him calling her and she found him on the floor. She called 911 and started CPR. EMS then arrived and transferred him to [facility]. He was VA cannulated 25F/17F ,8F reperfusion cannula  and IABP placed.       Neurology: # At risk for hypoxic brain injury  # Alcohol use disorder   Intubated, sedated, not paralyzed. Cooled to 34 degrees.  --continue versed 8 , fentanyl 100, propofol 30  as needed to maintain aralysis - RASS goal -4 to -5  - CTH without ICH (4/11)  - started cooling at 3 pm on 4/11  - neurocritical care following   - veeg when ruled out of COVID    - will monitor for alochol withdrawal whenever we wean of benzos, propofol   Cardiovascular / Hemodynamics: Refractory VF arrest.  MONTEZ to LAD.  Peripheral V-A ECMO inserted for VF arrest. LA 10.8   S/p proximal LAD stent (3.0x28mm synergy MONTEZ )for subtotal occlusion of pLAD.   TTE: pending   EKG: sinus rhythm with PVC, 1mm st depression on V1   --weaned off pressors/inotropes as able  --echo on 4/13 with ECMO turndown  - loaded with brillinta and aspirin , on cangrelol will stop at 6 pm  --continue ASA 81mg and ticagrelor 90mg BID  --hold temp at 34 C  --ACT goal 180-200  --hold lipitor for now given likely hepatic injury during arrest  --hold ACE/ARB for now given likely reduced renal fxn after arrest  --holding beta blocker given shock    Pulmonary: # risk for aspiration pneumonia  # intubated for airway protection in setting of cardiac arrest   Vent Settings: FiO2 60% , PEEP 7   ETT in the midthoracic trachea .  Now weaning vent requirements.  CXR: Lines in  stable position. Hazy upper lobe opacities   --wean vent as able  --daily CXR  Chest CT  --Q2h ABGs for now  --consider scheduled duonebs if signs of lung dz, currently PRN     GI and Nutrition: No known medical hx.   Transaminitis likely shock liver   EtOH use   --monitor BID LFTs  --NPO while cooled - nutrition consult pending feeding tube placement once he is warmed   --bowel regimen - on hold for now due to hypothermia  --GI Prophylaxis: PPI    Renal, Fluid and Electrolytes: Cr 1.14. UOP .  --monitor urine output  --maintain K>3 and Mg>2    Infectious Disease: No signs of infection. Leukocytosis c/w arrest. Blood cultures collected.   --vancomycin/zosyn x5 days for ECMO  --daily blood cultures  --monitor for signs of infection given cooling, lines, and leukocytosis   Hematology and Oncology: Receiving heparin for ECMO and ASA/ticagrelor for MONTEZ.   --cryo PRN fibrinogen < 200; FFP for INR >2  --Transfuse for Hgb<10  --heparin gtt for ECMO with ACT goal 160-180  --US LE w/ arterial duplex per ECMO protocol   --DVT PPX: Heparin as above   Endocrinology: No known medical history. BG elevated.  --insulin gtt  --no stress dose steroids due to persistent hypotension - hydrocort mg IV Q8h  --f/u HgbA1c    Lines: PA catheter April 11, 2020  R femoral arterial and venous ECMO cannulae April 11, 2020  L femoral arterial line April 11, 2020  R radial arterial line April 11, 2020  ETT April 11, 2020  Marin catheter April 11, 2020  OG tube April 11, 2020  Restraint: needed    Current lines are required for patient management       Family update by me today: Yes     Code Status: FULL CODE    The pt was discussed and evaluated with Dr. Harrison, Cardiologist, Dr Riggs, attending physician, who agrees with the assessment and plan above.     Juan Matamoros MD, MD  [facility] Heart  Cardiology  [phone]      HISTORY OF PRESENT ILLNESS:  Patient Dino Bundy is a 57 y/o M with PMH of HTN, EtOH use who  presented as VF cardiac arrest. Patient went to his girlfriend's house this morning (he biked there) and they decided to sit on the porch. While she was making coffee she heard him calling her and she found him on the floor. She called 911 and started CPR. EMS then arrived and transferred him to [facility]. Patient did not complaint of any symptoms before the arrest    Radha Duran: [phone]  Gumaro (son): [phone]  Rosa (mother):     Had chest pain on Tuesday night.  Had high blood pressure ; was not treated for that , has not seem physician for years. His BP was around 190-220 routinely.   He was recently in [place]      Witnessed arest (y/n): y  Home or public location (y/n): home  Bystander CPR (y/n): y  Time of 911 call:   Initial rhythm: VF  Did they have intermittent ROSC (y/n): n  # of shocks 11  Epi:  3 mg  Amio: 450 mg  Bicarb:  1 amps  EtCO2 en route: 38 (here in cath lab)   O2 sat en route:    Initial rhythm in the cath lab: vf  First AB.07 / 59/ 51/ 17 (Fio2 = 100%)  ECMO cannula size:   Meds given in the cath lab:      PAST MEDICAL HISTORY:  HTN  Alcohol use     Past surgical history:  Unable to obtain due to patient's condition    Family history:  Unable to obtain due to patient's condition    Social history  Alcohol use     MEDICATIONS:  PTA Meds  Prior to Admission medications    Not on File      Current Meds    Infusion Meds    argatroban       EPINEPHrine IV infusion ADULT 0.3 mcg/kg/min (20 1151)     HEParin       lidocaine       norepinephrine 0.001 mcg/kg/min (20 1153)     vasopressin (PITRESSIN) infusion ADULT (40 mL) 4 Units/hr (20 1158)       ALLERGIES:    Allergies not on file    REVIEW OF SYSTEMS:  A 10 point review of systems was negative except as noted above.    SOCIAL HISTORY:   Social History     Socioeconomic History     Marital status: Not on file     Spouse name: Not on file     Number of children: Not on file     Years of education: Not on file     Highest  education level: Not on file   Occupational History     Not on file   Social Needs     Financial resource strain: Not on file     Food insecurity     Worry: Not on file     Inability: Not on file     Transportation needs     Medical: Not on file     Non-medical: Not on file   Tobacco Use     Smoking status: Not on file   Substance and Sexual Activity     Alcohol use: Not on file     Drug use: Not on file     Sexual activity: Not on file   Lifestyle     Physical activity     Days per week: Not on file     Minutes per session: Not on file     Stress: Not on file   Relationships     Social connections     Talks on phone: Not on file     Gets together: Not on file     Attends [employer]ism service: Not on file     Active member of club or organization: Not on file     Attends meetings of clubs or organizations: Not on file     Relationship status: Not on file     Intimate partner violence     Fear of current or ex partner: Not on file     Emotionally abused: Not on file     Physically abused: Not on file     Forced sexual activity: Not on file   Other Topics Concern     Not on file   Social History Narrative     Not on file         FAMILY MEDICAL HISTORY:   No family history on file.      PHYSICAL EXAM:   No data recorded      No data recorded No data recorded       There were no vitals taken for this visit.      GENERAL APPEARANCE: intubated, sedated   Head: NC/AT  EYES: PERRL, pupils equal  HENT: Mouth without ulcers or lesions  NECK: Supple, no goiter  Pulmonary: Lungs clear to auscultation with equal breath sounds bilaterally, no clubbing or cyanosis  CV: Regular rhythm, normal rate, no rub.  GI: Soft, nontender, normal bowel sounds, no HSM   MS: No evidence of inflammation in joints, no muscle tenderness  SKIN: No rash, warm, drym ECMO cannulas in R femoral artery, vein , IABP in L femoral groin  NEURO: sedated     LABS:   CMP  Recent Labs   Lab 04/11/20  1132   *   POTASSIUM 3.6   *     CBC  Recent Labs    Lab 04/11/20  1132   HGB 14.0     INRNo lab results found in last 7 days.  ABG  Recent Labs   Lab 04/11/20  1132   PH 7.07*   PCO2 59*   PO2 51*   HCO3 17*   O2PER 100.0        IMAGING:

## 2020-04-11 NOTE — Clinical Note
Stent deployed in the proximal left anterior descending. Max pressure = 12 chilo. Total duration = 6 seconds.

## 2020-04-11 NOTE — Clinical Note
Potential access sites were evaluated for patency using ultrasound.   The right femoral artery and right femoral vein were selected. Access was obtained under with Sonosite guidance using a standard 18 guage needle with direct visualization of needle entry.

## 2020-04-11 NOTE — PROGRESS NOTES
Admitted/transferred from: Home/Cath lab  Reason for admission/transfer: Cardiac arrest, now on VA ECMO  2 RN skin assessment: completed by Meera  Result of skin assessment and interventions/actions: No pressure injury noted. Scratch sarah on left leg, closed and healing wound scar above R foot.  Height, weight, drug calc weight: Done  Patient belongings (see Flowsheet). Shirt and pants covered in stool was thrown away, shoes by window.   MDRO education added to care plan NA  ?

## 2020-04-11 NOTE — PLAN OF CARE
Major Shift Events:  Pt came to 4A from cathlab around 2pm. EMCO flow 4.2 lpm, speed 3600rpm, 80% and sweep 4. IABP 100% augmentation, 1:1 ratio.   On fentanyl, versed and propofol gtt. No pressors.   Pt was at therapeutic cool temp of 34C at 1500 when cable was plugged in. Tongue bleeding from pt biting down, bite guard placed.     Plan: Hemodynamically stabilize pt.  For vital signs and complete assessments, please see documentation flowsheets.        Problem: Adult Inpatient Plan of Care  Goal: Readiness for Transition of Care  Outcome: No Change     Problem: Bleeding (Extracorporeal Life Support/ECMO)  Goal: Absence of Bleeding  Outcome: No Change     Problem: Cardiac Output Decreased (Extracorporeal Life Support/ECMO)  Goal: Effective Cardiac Output  Outcome: No Change     Problem: Infection (Extracorporeal Life Support/ECMO)  Goal: Absence of Infection Signs/Symptoms  Outcome: No Change

## 2020-04-12 NOTE — PROGRESS NOTES
CPT: 00492 (23electrodes), 71667  University/IP/Video EEG/ Intermittent monitoring  Reading MD: Christy

## 2020-04-12 NOTE — PROGRESS NOTES
ECMO Attending Progress Note  4/12/2020    Dino Bundy is a 89 year old male who was cannulated for ECMO 4/11/2020 due to refractory VF arrest    Interval events: stabilized hemodynamics with 1 inotrope/pressor, oozing from cannulation site improved with pressure and reduced ACT goal to 160-180    ECMO Equipment:  Console serial number:[id number] #5  Circuit Lot number:  Cardiohelp [id number]  Oxygenator Lot number:[id number]    Physical Exam:  Temp:  [92.1  F (33.4  C)-96.4  F (35.8  C)] 94.6  F (34.8  C)  Heart Rate:  [] 84  Resp:  [14-33] 28  MAP:  [57 mmHg-97 mmHg] 68 mmHg  Arterial Line BP: ()/(39-78) 92/50  FiO2 (%):  [40 %] 40 %  SpO2:  [90 %-100 %] 100 %    Intake/Output Summary (Last 24 hours) at 4/12/2020 1440  Last data filed at 4/12/2020 1400  Gross per 24 hour   Intake 6254.9 ml   Output 1470 ml   Net 4784.9 ml    Ventilation Mode: CMV/AC  (Continuous Mandatory Ventilation/ Assist Control)  FiO2 (%): 40 %  Rate Set (breaths/minute): 14 breaths/min  Tidal Volume Set (mL): 450 mL  PEEP (cm H2O): 7 cmH2O  Oxygen Concentration (%): 40 %  Resp: 28     General: no acute distress, intubated and sedated  HEENT: PERRLA, conjunctiva clear, without icterus or pallor, oropharyx clear  Cardiac: RRR nl S1S2   Lungs: clear to auscultation and percussion bilaterally anterior  Abdomen: soft, nontender, non distended, no hepatosplenomegaly or masses  Extremities: without edema or cyanosis; pulses are palpable;   Skin: normal skin appearance without worrisome lesions.   Neurologic:intubated and sedated    Labs:  Recent Labs   Lab 04/12/20  1400 04/12/20  1129 04/12/20  0958 04/12/20  0813   PH 7.23* 7.28* 7.36 7.37   PCO2 42 41 41 42   PO2 159* 92 62* 106*   HCO3 18* 19* 23 24   O2PER 40 40 40 40.0      Recent Labs   Lab 04/12/20  0958 04/12/20  0336 04/11/20  2207 04/11/20  1625   WBC 10.3 6.7 9.3 13.4*   HGB 9.1* 8.5* 8.4* 12.1*     Creatinine   Date Value Ref Range Status   04/12/2020 1.83 (H) 0.66 - 1.25  mg/dL Final   2020 1.61 (H) 0.66 - 1.25 mg/dL Final   2020 1.50 (H) 0.66 - 1.25 mg/dL Final   2020 1.18 0.66 - 1.25 mg/dL Final       Arterial Cannula Marshallese: 17  Venous Cannula Location: RFV  Blood Flow (Circuit) LPM: 4.48 LPM  Gas Flow  LPM: 4 LPM  Gas FiO2   %: 80 %  ACT  (seconds): 150 seconds  Blood Temp  (degrees C): 35 C  Pulse Oximetry  (SpO2%): 100 %  Arterial Pressure  mmH mmHg      ECMO Issues including assessments and plan on DOS 2020:  Neuro: Sedated for mechanical ventilation and ECMO.  NIRS stable b/l  RASS goal: -3  CV: Cardiogenic shock.  Hemodynamically stable on epi  Pulm: Keep vent settings at rest settings  FEN/Renal: Electrolytes stable w/ replacement protocols in place, Cr stable, UOP stable  Heme: ACT goal: 160-180, Hemoglobin stable .  Goals: if O2 sat >85% Hgb >8.  If O2 Sat <85% keep Hgb 10-12.  Minimal oozing around the ECMO cannulas.  ID: Receiving empiric antibiotics  Cannulae: Position is acceptable on exam and the available imaging.  Distal perfusion cannula is in place and patent.     I have personally reviewed the ECMO flows, oxygenation and CO2 clearance, anticoagulation, and cannula position.  I have also personally assessed the patient's systemic response with hemodynamics, oxygenation, ventilation, and bleeding.       The patient requires continued ECMO support and management in the ICU.  I have discussed patient care and treatment plan with the primary team.      Jordon Riggs MD, PhD  Interventional/Critical Care Cardiology  [phone]    2020

## 2020-04-12 NOTE — CONSULTS
[facility]    Patient Name:  Odilia Gusman  MRN:  [id number]    :  1900    Date of Admission:  2020  Date of Service:  2020             Assessment and Plan     #1 Concern for anoxic brain injury after cardiac arrest     Mr. Bundy is a gentleman who had a refractory cardiac arrest on  and is now hypothermic and sedated. We will continue to follow as he is rewarmed.     Recommendations:  - No new recommendations today    [place] our service [place] [phone] with any questions or concerns.          Consult Question / Reason for Consult       Concern for anoxic brain injury after cardiac arrest          HPI     Mr. Bundy is a 56 year old gentleman who presented after a cardiac arrest. The arrest was witnessed and bystander CPR was performed. Rhythm was VF and the patient received 11 shocks and 3 rounds of epi. He was apparently on the Giovanni for over 1 hr. LAD disease was found in the cath lab and stent was placed. He is currently hypothermic to be rewarmed today. He is on VA ECMO. At the time of my initial examination he was sedated with propofol 20, midazolam 4 and fentanyl 100. Later the propofol was turned off and the midazolam was increased to 12. There was no substantial change in his examination.      CT head reveals possible loss of grey/white differentiation in the frontal lobes. EEG reveals severe suppression.     He has acute liver and kidney injuries, still worsening.     ROS: Unable to obtain.     Past Medical History:   I have reviewed this patient's past medical history.    Family History:  Family history reviewed.    Social History:  Social history reviewed.    Home Medications:  No medications prior to admission.       Current Medications:  Current Facility-Administered Medications   Medication     albumin human 25 % injection 12.5 g     albumin human 5 % injection 25 g     albuterol (PROAIR HFA/PROVENTIL HFA/VENTOLIN HFA) 108  (90 Base) MCG/ACT inhaler 6 puff     artificial tears ophthalmic ointment     aspirin (ASA) chewable tablet 81 mg     calcium chloride in  mL intermittent infusion 1 g     calcium chloride injection 1 g     dextrose 10% infusion     glucose gel 15-30 g    Or     dextrose 50 % injection 25-50 mL    Or     glucagon injection 1 mg     EPINEPHrine (ADRENALIN) 5 mg in sodium chloride 0.9 % 250 mL infusion     fentaNYL (SUBLIMAZE) bolus from infusion pump-ADULT 50 mcg     fentaNYL (SUBLIMAZE) infusion     heparin 100 UNIT/ML injection 370-730 Units     heparin 2 unit/mL in 0.9% NaCl (for REPERFUSION CATHETER)     heparin 25,000 units in 0.45% NaCl 250 mL ANTICOAGULANT  infusion     insulin 1 unit/mL in saline (NovoLIN, HumuLIN Regular) drip - ADULT IV Infusion     lidocaine (LMX4) cream     lidocaine 1 % 0.1-1 mL     magnesium sulfate 2 g in water intermittent infusion     magnesium sulfate 4 g in 100 mL sterile water (premade)     Medication Considerations - To maintain platelet inhibition after discontinuation of cangrelor (KENGREAL) [see admin instructions]     midazolam (VERSED) drip - ADULT 100 mg/100 mL in NS PRE-MIX     midazolam (VERSED) injection 1-3 mg     midazolam (VERSED) injection 1-3 mg     naloxone (NARCAN) injection 0.1-0.4 mg     norepinephrine (LEVOPHED) 16 mg in  mL infusion     oxymetazoline (AFRIN) 0.05 % spray 2 spray     pantoprazole (PROTONIX) 40 mg IV push injection     piperacillin-tazobactam (ZOSYN) 3.375 g vial to attach to  mL bag     potassium chloride 20 mEq in 50 mL intermittent infusion     propofol (DIPRIVAN) infusion     sodium chloride (PF) 0.9% PF flush 3 mL     sodium chloride (PF) 0.9% PF flush 3 mL     sodium phosphate 10 mmol in D5W intermittent infusion     sodium phosphate 15 mmol in D5W intermittent infusion     sodium phosphate 20 mmol in D5W intermittent infusion     sodium phosphate 25 mmol in D5W intermittent infusion     ticagrelor (BRILINTA) tablet 90 mg  "    [START ON 4/13/2020] vancomycin (VANCOCIN) 1,750 mg in sodium chloride 0.9 % 250 mL intermittent infusion       Allergies: Allergies not on file           Physical Examination      Weight:200 lbs 2.84 oz; Height:5' 10\"  Temp: 94.6  F (34.8  C) Temp src: Esophageal     Heart Rate: 65 Resp: 14 SpO2: 100 % O2 Device: Mechanical Ventilator      General:  Patient lying in bed without any acute distress    HEENT:  EEG leads in place  Cardio:  Regular rate and rhythm   Pulmonary:  Intubated and mechanically ventilated   Extremities:  Extremity edema  Skin:  Warm/dry     Neurologic Examination:  Mental Status: Unresponsive to noxious stimulation.   Language: Nonverbal. Intubated.    Speech: Nonverbal. Intubated.    Cranial Nerves: Pupils equal and reactive to light bilaterally. Corneal reflex present bilaterally. Oculocephalic reflex absent.  Cough reflex present.  Sensory/Motor: Deferred            Labs and Imaging Studies       Recent Labs   Lab Test 04/12/20  0336 04/11/20 2207 04/11/20  1625   * 147* 145*   POTASSIUM 4.0 3.9 4.0   CHLORIDE 113* 113* 112*   CO2 25 26 23   ANIONGAP 8 9 10   * 106*  112* 172*  179*   BUN 25 22 19   CR 1.61* 1.50* 1.18   COLEEN 6.8* 7.0* 7.2*   WBC 6.7 9.3 13.4*   RBC 2.65* 2.61* 3.80*   HGB 8.5* 8.4* 12.1*    210 325       Recent Labs   Lab 04/12/20  0813 04/12/20  0601 04/12/20  0336 04/12/20  0150   PH 7.37 7.39 7.38 7.39   PCO2 42 41 42 43   PO2 106* 149* 140* 117*   HCO3 24 25 25 26   O2PER 40.0 40.0  40.0 40.0 40.0       Recent Labs   Lab 04/12/20  0812 04/12/20  0613 04/12/20  0343 04/12/20  0336 04/12/20  0156 04/12/20  0016 04/11/20  2213 04/11/20  2207  04/11/20  1625  04/11/20  1436  04/11/20  1305 04/11/20  1300   GLC  --   --   --  122*  --   --   --  106*  112*  --  172*  179*  --  187*  --  221* 215*  215*   * 105* 123*  --  122* 107* 108*  --    < >  --    < >  --    < >  --   --     < > = values in this interval not displayed.       "

## 2020-04-12 NOTE — PROGRESS NOTES
ECMO Shift Summary:      ECMO Equipment:  Console serial number: [id number] #5  Circuit Lot number: [id number]  Oxygenator Lot number: [id number]        Patient remains on VA ECMO, all equipment is functioning and alarms are appropriately set. RPM's 8405-0871 with flow range 3.84-4.40 L/min. Sweep gas is at 4 LPM and FiO2 70%. Circuit remains free of air, clot and fibrin. Cannulas are secure with large amounts of bleeding from insertion sites, IABP site and inside mouth. Extremities are cool with doppler pulses. Gtts; Versed, Fent, Amio, Insulin, Heparin    Significant Shift Events: Start of shift ACT goal was lowered to 160-180 due to oozing from oral cavity. Throughout the night shift, pt began to ooze from both L and R femoral sites continuously. Loosing significant amount of blood. MD was notified. Flows were lowered from 4.40 down to 3.84 due to chugging issues and loosing flows at time. Pt is not on any pressors and holding his own with blood pressure. However, throughout the night multiple product given and per MD keep ACT on the lower 160 side. Face began to swell overnight as well as lips and mouth. Suction bloody sputum from ETT. Heater on circuit is connected but not on as we are cooling patient with rewarming to start at 1500 today. EEG applied. Hematoma was noticed on L IABP site, Md notified and sandbags added to both L and R groin. Afrin added to oral cavity to help with oozing. Moved from 4A room 205 to 4E room 501 due to negative Covid-19 test.     Vent settings:  Ventilation Mode: CMV/AC  (Continuous Mandatory Ventilation/ Assist Control)  FiO2 (%): 40 %  Rate Set (breaths/minute): 14 breaths/min  Tidal Volume Set (mL): 450 mL  PEEP (cm H2O): 7 cmH2O  Oxygen Concentration (%): 40 %  Resp: 14  .    Heparin is running at 800 units/hr, ACT range 150-231. Goal 160-180 and to keep closer to 160 goal.     Urine output is 50cc/hr and as RN has charted, blood loss was significant, started 1500 at  13, now at 0400 down to 8.3. Product given included 2 PRBCs, total of 1500cc of 5% Albumin as well as 50cc of 25% Albumin, total of 2L of LR throughout shift.       Intake/Output Summary (Last 24 hours) at 4/12/2020 0419  Last data filed at 4/12/2020 0400  Gross per 24 hour   Intake 3590.54 ml   Output 1075 ml   Net 2515.54 ml       ECHO:  No results found for this or any previous visit.No results found for this or any previous visit.    CXR:  Recent Results (from the past 24 hour(s))   Cardiac Catheterization    Narrative      Successful insertion and initiation of peripheral VA ECMO with a 17Fr   arterial and 25Fr venous cannula    One vessel coronary artery disease with subtotal occlusion of the   proximal LAD    Successful PCI of the proximal LAD with a drug eluting stent    Successful initiation of therapeutic hypothermia with a thermogard   cooling catheter    Successful insertion of a right radial arterial line    Successful insertion of a 8Fr distal perfusion cannula in the R SFA    Successful insertion of a 50cc IABP in the L femoral artery      CT Head w/o Contrast    Narrative    CT HEAD W/O CONTRAST 4/11/2020 1:58 PM    Provided History: ECMO  ICD-10:    Comparison: None.    Technique: Using multidetector thin collimation helical acquisition  technique, axial, coronal and sagittal CT images from the skull base  to the vertex were obtained without intravenous contrast.     Findings:  There is contrast within the intracranial arterial and  venous structures due to recent IV contrast administration for  abdomen/pelvis CT study. For this reason evaluation of subarachnoid  hemorrhage is precluded. There is no intracranial mass effect, shift  or herniation. No definite areas of hypoattenuation or sulcal  effacement to suggest brain edema. There is no hydrocephalus. No  extra-axial subdural or epidural collections. Mild inflammatory  mucosal thickening of ethmoid air cells. The remainder of the  paranasal  sinuses and mastoid air cells are clear. Skull base and  calvarium are intact.      Impression    IMPRESSION: Given presence of contrast within the vessels from prior  intravenous contrast administration, evaluation for subarachnoid  hemorrhage is precluded. No evidence of mass effect or herniation. No  definite evidence of brain edema at this time. Follow-up CT can be  obtained.    I have personally reviewed the examination and initial interpretation  and I agree with the findings.    VIVIENNE ARAIZA MD   CT Chest Abdomen Pelvis w/o Contrast    Narrative    EXAMINATION: CT of the Chest, Abdomen and Pelvis on 4/11/2020.    INDICATION: ECMO     COMPARISON: None     TECHNIQUE: Axial images of the chest, abdomen and pelvis were obtained  without contrast. Coronal reconstructions were provided. Images were  reviewed in bone, lung, and soft tissue windows.    Dose: 1746 mGy*cm    FINDINGS:    Chest:    Thyroid is grossly unremarkable. Endotracheal tube with tip projecting  at the mid thoracic trachea. Normal great vessels off the thoracic  aorta. The heart is mildly enlarged. Postsurgical changes of coronary  artery stenting. Trace pericardial effusion. No appreciable  mediastinal, hilar or axillary lymphadenopathy. Intra-arterial balloon  pump with tip projecting at the distal aortic arch.    The central tracheobronchial tree is clear.  No pleural effusion.  Bibasilar atelectatic changes. Diffuse intralobular septal thickening.  Numerous bilateral mixed solid and groundglass pulmonary nodules with  apical predominance. For example:  -Right upper lobe 8 mm groundglass pulmonary nodule (series 2 image  46)  -Left upper lobe 8 mm solid pulmonary nodule (series 2 image 113)  -Right upper lobe 6 mm solid pulmonary nodule (series 2 image 101).    Abdomen and Pelvis:    Findings: Right inferior approach ECMO cannula with tip projecting at  the superior cavoatrial junction. Intra-aortic balloon pump with  inferior marker just  below the renal arteries and superior marker at  the aortic arch. Left lower extremity approach central venous catheter  with tip projecting at the inferior cavoatrial junction. Right  arterial ECMO cannula with tip projecting at the aortic bifurcation.  Left lower approach aortic balloon pump. Enteric tube with tip and  side-port project in the stomach.    The liver parenchyma appears homogenous hepatic steatosis. No  appreciable focal hepatic lesion. The gallbladder is nondistended. No  cholelithiasis. No intrahepatic or extrahepatic biliary duct dilation.  The spleen is grossly unremarkable. The pancreas is within normal  limits. Bilateral adrenal glands are unremarkable.    Mildly contrast enhancing kidneys, likely secondary to recent  angiogram procedure.  No hydronephrosis. No hydroureter. The bladder  is decompressed with Marin catheter in place. No pelvic masses.    The large and small bowel are normal in caliber. No abnormal contrast  enhancement wall thickening. The appendix is grossly unremarkable.  Stomach is within normal limits.    No appreciable intra-abdominal lymphadenopathy by size criteria. Small  hematomas noted in bilateral inguinal regions. Otherwise soft tissues  are grossly unremarkable.    No acute osseous lesions. Sternal fracture with 9.7 cm retrosternal  hematoma in the craniocaudal dimension. Mild degenerative changes of  the thoracolumbar spine. Fracture left sixth rib.       Impression    IMPRESSION:  1. Stable support devices as above.  2. 9.7 cm retrosternal hematoma and sternal fracture.  3. Pulmonary edema. Multiple groundglass nodules, predominantly in the  upper lobe. May be infectious versus reactive.   4. Bilateral small hematomas in the inguinal region.  5. Fractured left anterior sixth rib.    I have personally reviewed the examination and initial interpretation  and I agree with the findings.    RONNI EL MD   XR Abdomen Port 1 View    Narrative    XR ABDOMEN PORT 1  VW on 4/11/2020 2:52 PM.    INDICATION: verify OG.    COMPARISON: Same-day CT.    FINDINGS:   Portable AP supine radiograph of the abdomen. New enteric tube tip and  sidehole project over the stomach. ECMO cannulae. Inferior approach  central venous catheter. Nonobstructive bowel gas pattern. No  pneumatosis or portal venous gas.      Impression    IMPRESSION:   New enteric tube tip and sidehole projected over the stomach.     I have personally reviewed the examination and initial interpretation  and I agree with the findings.    RONNI EL MD   XR Chest Port 1 View    Narrative    EXAMINATION: XR CHEST PORT 1 VW, 4/11/2020 2:53 PM    INDICATION: Check endotracheal tube placement and ECLS cannula  placement. DO NOT log-roll patient.  Place film under patient using  patient safety handling process.    COMPARISON: Chest CT same day    FINDINGS: Single AP supine radiograph of chest    Endotracheal tube projects in the mid thoracic trachea. Heart is  mildly enlarged. Bilateral upper lobe prominent hazy opacities. No  pleural effusion. No pneumothorax. Enteric tube with tip collimated  off the field-of-view. Partially visualized inferior approach right  ECMO cannula with tip projecting at the lower SVC. Intra-arterial  balloon pump noted adjacent to the T6 vertebral body.      Impression    IMPRESSION:  1. Endotracheal tube in adequate position.  2. Multiple apical predominant hazy opacities. May be infectious  versus edema.    I have personally reviewed the examination and initial interpretation  and I agree with the findings.    RONNI EL MD       Labs:  Recent Labs   Lab 04/12/20  0150 04/12/20  0004 04/11/20  2207 04/11/20  1954   PH 7.39 7.38 7.38 7.39   PCO2 43 44 44 42   PO2 117* 87 95 182*   HCO3 26 26 26 25   O2PER 40.0 40.0 40 1,954       Lab Results   Component Value Date    HGB 8.5 (L) 04/12/2020    PHGB 70 (H) 04/11/2020     04/12/2020    FIBR 186 (L) 04/12/2020    INR 1.49 (H)  04/12/2020    PTT >240 (HH) 04/11/2020    DD 2.7 (H) 04/12/2020    AXA 0.62 04/12/2020    ANTCH 68 (L) 04/11/2020         Plan is remain on VA ECMO. Start rewarming process around 1500 today. Support and wean as tolerated.       Whitley Phillip RN  4/12/2020 4:19 AM

## 2020-04-12 NOTE — PLAN OF CARE
Major Shift Events:  Lactic acid trended up to 9.0 then down to 4.8. Total of 1L LR bolus, 500 ml albumin and 2 PRBC given. Bleeding from R groin and tongue improved. On fentanyl, versed, levo 0.03 mcg/kg/min.      Plan: Rewarm started at 5 pm from 35C to goal 37C at 0.25C/hr, Continue to rewarm, monitor electrolytes and hemodynamics.  For vital signs and complete assessments, please see documentation flowsheets.        Problem: Bleeding (Extracorporeal Life Support/ECMO)  Goal: Absence of Bleeding  4/12/2020 1820 by Timo Nieto, RN  Outcome: Improving     Problem: Infection (Extracorporeal Life Support/ECMO)  Goal: Absence of Infection Signs/Symptoms  4/12/2020 1820 by Timo Nieto RN  Outcome: No Change     Problem: Cardiac Output Decreased (Extracorporeal Life Support/ECMO)  Goal: Effective Cardiac Output  4/12/2020 1820 by Timo Nieto, RN  Outcome: No Change     Problem: Adult Inpatient Plan of Care  Goal: Readiness for Transition of Care  4/12/2020 1820 by Timo Nieto, RN  Outcome: No Change

## 2020-04-12 NOTE — PROGRESS NOTES
CARDIOLOGY-CSI PROGRESS NOTE  Odilia Gusman MRN:[id number] YOB: 1900  Date of Admission:4/11/2020    ASSESSMENT AND PLAN:   Patient Dino Bundy is a 57 y/o M with PMH of HTN, EtOH use who presented as VF cardiac arrest. Patient went to his girlfriend's house this morning (he biked there) and they decided to sit on the porch. While she was making coffee she heard him calling her and she found him on the floor. She called 911 and started CPR. EMS then arrived and transferred him to [facility]. He was VA cannulated 25F/17F ,8F reperfusion cannula  and IABP placed.     24 hour events:  -- Warmed to 35.8 and then down to 35 C due to bleeding   -- ACT goal 160-180 due to bleeding   -- ECMO flow increased to 4.7 L   -- Pressor for MAP > 75 mmHg started epinephrine and switched to norepinephrine   -- Albumin and pRBCs overnight for chugging (~1L albumin and 2 U pRBCs)  -- Moderate pericardial effusion on ECHO, underfilled LV and RV   -- Lactate rising thought to be likely from epi and warming , will repeat and if needed to CT c/a/p to assess for ischemic bowel  -- will record bis     Neurology: # At risk for hypoxic brain injury  # Alcohol use disorder   Intubated, sedated, not paralyzed. Cooled to 34 degrees.  --continue versed 8 , fentanyl 100, propofol weaned off   - RASS goal -4 to -5  - CTH without ICH (4/11)  - started cooling at 3 pm on 4/11 will start warming today  - neurocritical care following   - veeg when ruled out of COVID    - will monitor for alochol withdrawal whenever we wean of benzos, propofol   Cardiovascular / Hemodynamics: Refractory VF arrest.  MONTEZ to LAD.  Peripheral V-A ECMO inserted for VF arrest. LA 10.8   S/p proximal LAD stent (3.0x28mm synergy MONTEZ )for subtotal occlusion of pLAD.   ECMO: flow 4.7 L, sweep 2 , FiO2 = 70%   TTE: pericardial effusion, EF=5%, underfilled cavities (4/12)   EKG: sinus rhythm with PVC, 1mm st depression on V1   --weaned off pressors/inotropes  as able  --echo on 4/13 with ECMO turndown  - loaded with brillinta and aspirin , on cangrelol will stop at 6 pm  --continue ASA 81mg and ticagrelor 90mg BID  --will start rewarming later today   --ACT goal 160-180  --hold lipitor for now given likely hepatic injury during arrest  --hold ACE/ARB for now given likely reduced renal fxn after arrest  --holding beta blocker given shock    Pulmonary: # risk for aspiration pneumonia  # intubated for airway protection in setting of cardiac arrest   Vent Settings: FiO2 60% , PEEP 7   ETT in the midthoracic trachea .  Now weaning vent requirements.  Overbreathing vent ; tachypnoic today   CXR: Lines in stable position. Hazy upper lobe opacities   --wean vent as able  --daily CXR  Chest CT  --Q2h ABGs for now  --consider scheduled duonebs if signs of lung dz, currently PRN     GI and Nutrition: No known medical hx.   Transaminitis likely shock liver   EtOH use   --monitor BID LFTs  --NPO while cooled - nutrition consult pending feeding tube placement once he is warmed   --bowel regimen - on hold for now due to hypothermia  --GI Prophylaxis: PPI    Renal, Fluid and Electrolytes: Acute kidney injury   Likely ATN during arrest   Cr 1.14. UOP 0.6 L   --monitor urine output  --maintain K>3 and Mg>2    Infectious Disease: No signs of infection. Leukocytosis c/w arrest. Blood cultures collected.   --vancomycin/zosyn x5 days for ECMO  --daily blood cultures  --monitor for signs of infection given cooling, lines, and leukocytosis   Hematology and Oncology: Receiving heparin for ECMO and ASA/ticagrelor for MONTEZ.   --cryo PRN fibrinogen < 200; FFP for INR >2  --Transfuse for Hgb<10  --heparin gtt for ECMO with ACT goal 160-180  --US LE w/ arterial duplex per ECMO protocol   --DVT PPX: Heparin as above   Endocrinology: No known medical history. BG elevated.  --insulin gtt  --no stress dose steroids due to persistent hypotension - hydrocort mg IV Q8h  --f/u HgbA1c    Lines: PA catheter  2020  R femoral arterial and venous ECMO cannulae 2020  L femoral arterial line 2020  R radial arterial line 2020  ETT 2020  Marin catheter 2020  OG tube 2020  Restraint: needed    Current lines are required for patient management       Family update by me today: Yes     Code Status: FULL CODE    The pt was discussed and evaluated with Dr. Harrison, Cardiologist, Dr Riggs, attending physician, who agrees with the assessment and plan above.     Juan Matamoros MD, MD  [facility]  Cardiology  [phone]      HISTORY OF PRESENT ILLNESS:  Patient Dino Bundy is a 57 y/o M with PMH of HTN, EtOH use who presented as VF cardiac arrest. Patient went to his girlfriend's house this morning (he biked there) and they decided to sit on the porch. While she was making coffee she heard him calling her and she found him on the floor. She called 911 and started CPR. EMS then arrived and transferred him to [facility]. Patient did not complaint of any symptoms before the arrest    Radha Duran: [phone]  Gumaro (son): [phone]  Rosa (mother):     Had chest pain on Tuesday night.  Had high blood pressure ; was not treated for that , has not seem physician for years. His BP was around 190-220 routinely.   He was recently in [place]      Witnessed arest (y/n): y  Home or public location (y/n): home  Bystander CPR (y/n): y  Time of 911 call:   Initial rhythm: VF  Did they have intermittent ROSC (y/n): n  # of shocks 11  Epi:  3 mg  Amio: 450 mg  Bicarb:  1 amps  EtCO2 en route: 38 (here in cath lab)   O2 sat en route:    Initial rhythm in the cath lab: vf  First AB.07 / 59/ 51/ 17 (Fio2 = 100%)  ECMO cannula size:   Meds given in the cath lab:      PAST MEDICAL HISTORY:  HTN  Alcohol use     Past surgical history:  Unable to obtain due to patient's condition    Family history:  Unable to obtain due to patient's condition    Social  history  Alcohol use     MEDICATIONS:  PTA Meds  Prior to Admission medications    Not on File      Current Meds    albumin human  25 g Intravenous Once     artificial tears   Both Eyes Q8H     aspirin  81 mg Per Feeding Tube Daily     lactated ringers  1,000 mL Intravenous Once     lactated ringers  1,000 mL Intravenous Once     oxymetazoline  2 spray Topical BID     pantoprazole (PROTONIX) IV  40 mg Intravenous Daily     piperacillin-tazobactam  3.375 g Intravenous Q6H     sodium chloride (PF)  3 mL Intracatheter Q8H     ticagrelor  90 mg Oral BID     [START ON 4/13/2020] vancomycin (VANCOCIN) IV  1,750 mg (central catheter) Intravenous Q24H     Infusion Meds    dextrose       EPINEPHrine IV infusion ADULT Stopped (04/12/20 1324)     fentaNYL 100 mcg/hr (04/12/20 1406)     heparin 2 unit/mL in 0.9% NaCl 3 mL/hr (04/12/20 1400)     HEParin 750 Units/hr (04/12/20 1416)     insulin (regular) 1 Units/hr (04/12/20 1401)     - MEDICATION INSTRUCTIONS -       midazolam 8 mg/hr (04/12/20 1408)     norepinephrine 0.1 mcg/kg/min (04/12/20 1400)     propofol (DIPRIVAN) infusion Stopped (04/12/20 1310)       ALLERGIES:    Not on File    REVIEW OF SYSTEMS:  A 10 point review of systems was negative except as noted above.    SOCIAL HISTORY:   Social History     Socioeconomic History     Marital status: Not on file     Spouse name: Not on file     Number of children: Not on file     Years of education: Not on file     Highest education level: Not on file   Occupational History     Not on file   Social Needs     Financial resource strain: Not on file     Food insecurity     Worry: Not on file     Inability: Not on file     Transportation needs     Medical: Not on file     Non-medical: Not on file   Tobacco Use     Smoking status: Not on file   Substance and Sexual Activity     Alcohol use: Not on file     Drug use: Not on file     Sexual activity: Not on file   Lifestyle     Physical activity     Days per week: Not on file      "Minutes per session: Not on file     Stress: Not on file   Relationships     Social connections     Talks on phone: Not on file     Gets together: Not on file     Attends Synagogue service: Not on file     Active member of club or organization: Not on file     Attends meetings of clubs or organizations: Not on file     Relationship status: Not on file     Intimate partner violence     Fear of current or ex partner: Not on file     Emotionally abused: Not on file     Physically abused: Not on file     Forced sexual activity: Not on file   Other Topics Concern     Not on file   Social History Narrative     Not on file         FAMILY MEDICAL HISTORY:   No family history on file.      PHYSICAL EXAM:      Temp 94.1  F (34.5  C)   Resp (!) 32   Ht 1.778 m (5' 10\")   Wt 90.8 kg (200 lb 2.8 oz)   SpO2 100%   BMI 28.72 kg/m        GENERAL APPEARANCE: intubated, sedated   Head: NC/AT  EYES: PERRL, pupils equal  HENT: Mouth without ulcers or lesions  NECK: Supple, no goiter  Pulmonary: Lungs clear to auscultation with equal breath sounds bilaterally, no clubbing or cyanosis  CV: Regular rhythm, normal rate, no rub.  GI: Soft, nontender, normal bowel sounds, no HSM   MS: No evidence of inflammation in joints, no muscle tenderness  SKIN: No rash, warm, drym ECMO cannulas in R femoral artery, vein , IABP in L femoral groin  NEURO: sedated     LABS:   CMP  Recent Labs   Lab 04/12/20  0958 04/12/20  0336 04/11/20  2207 04/11/20  1625   * 146* 147* 145*   POTASSIUM 4.2 4.0 3.9 4.0   CHLORIDE 113* 113* 113* 112*   CO2 23 25 26 23   ANIONGAP 11 8 9 10   *  168* 122* 106*  112* 172*  179*   BUN 28 25 22 19   CR 1.83* 1.61* 1.50* 1.18   GFRESTIMATED 26* 30* 33* 44*   GFRESTBLACK 30* 35* 38* 51*   COLEEN 7.0* 6.8* 7.0* 7.2*   MAG 1.9 1.9 1.8 1.9   PHOS  --  4.6*  --   --    PROTTOTAL 4.7* 5.0* 4.8* 5.6*   ALBUMIN 3.0* 3.4 3.0* 2.9*   BILITOTAL 1.2 0.9 0.6 0.5   ALKPHOS 25* 26* 30* 47   AST 1,575* 1,510* 1,466* 967* "   * 215* 209* 198*     CBC  Recent Labs   Lab 04/12/20  0958 04/12/20  0336 04/11/20 2207 04/11/20  1625   HGB 9.1* 8.5* 8.4* 12.1*   WBC 10.3 6.7 9.3 13.4*   RBC 2.81* 2.65* 2.61* 3.80*   HCT 27.0* 25.5* 25.6* 37.1*   MCV 96 96 98 98   MCH 32.4 32.1 32.2 31.8   MCHC 33.7 33.3 32.8 32.6   RDW 14.2 13.8 13.4 13.2    160 210 325     INR  Recent Labs   Lab 04/12/20  0958 04/12/20  0336 04/11/20 2207 04/11/20  1625   INR 1.44* 1.49* 1.52* 1.37*   * >240* >240* >240*     ABG  Recent Labs   Lab 04/12/20  1400 04/12/20  1129 04/12/20  0958 04/12/20  0813   PH 7.23* 7.28* 7.36 7.37   PCO2 42 41 41 42   PO2 159* 92 62* 106*   HCO3 18* 19* 23 24   O2PER 40 40 40 40.0        IMAGING:

## 2020-04-12 NOTE — PROGRESS NOTES
PRELIMINARY EEG REPORT:    The first hr of EEG was reviewed. EEG is severely suppressed with electrocerebral activity less than 5 microvolts, consistent with severe diffuse nonspecific encephalopathy. No epileptiform discharges or seizures.    Full report to come  Elin Brice MD

## 2020-04-12 NOTE — PROGRESS NOTES
ECMO Shift Summary:      ECMO Equipment:  Console serial number:[id number] #5  Circuit Lot number:  Nimishap [id number]  Oxygenator Lot number:[id number]        Patient remains on VA ECMO, all equipment is functioning and alarms are appropriately set. RPM's 3677-8248 with flow range 4.08-4.74 L/min. Sweep gas is at 4 LPM and FiO2 80%. Circuit remains free of air, clot and fibrin. Cannulas are secure with no bleeding from site. Extremities are cool with doppler pulses.    Significant Shift Events: start rewarming, ECHO imaging done.    Vent settings:  Ventilation Mode: CMV/AC  (Continuous Mandatory Ventilation/ Assist Control)  FiO2 (%): 40 %  Rate Set (breaths/minute): 14 breaths/min  Tidal Volume Set (mL): 450 mL  PEEP (cm H2O): 7 cmH2O  Oxygen Concentration (%): 40 %  Resp: (!) 32  .    Heparin is running at 750 u/kg/hr, ACT range 150-167.      Product given included one unite of one RBC unite & albumin.      Intake/Output Summary (Last 24 hours) at 4/12/2020 1229  Last data filed at 4/12/2020 1200  Gross per 24 hour   Intake 5146 ml   Output 1345 ml   Net 3801 ml       ECHO:  No results found for this or any previous visit.No results found for this or any previous visit.    CXR:  Recent Results (from the past 24 hour(s))   Cardiac Catheterization    Narrative      Successful insertion and initiation of peripheral VA ECMO with a 17Fr   arterial and 25Fr venous cannula    One vessel coronary artery disease with subtotal occlusion of the   proximal LAD    Successful PCI of the proximal LAD with a drug eluting stent    Successful initiation of therapeutic hypothermia with a thermogard   cooling catheter    Successful insertion of a right radial arterial line    Successful insertion of a 8Fr distal perfusion cannula in the R SFA    Successful insertion of a 50cc IABP in the L femoral artery      CT Head w/o Contrast    Narrative    CT HEAD W/O CONTRAST 4/11/2020 1:58 PM    Provided History:  ECMO  ICD-10:    Comparison: None.    Technique: Using multidetector thin collimation helical acquisition  technique, axial, coronal and sagittal CT images from the skull base  to the vertex were obtained without intravenous contrast.     Findings:  There is contrast within the intracranial arterial and  venous structures due to recent IV contrast administration for  abdomen/pelvis CT study. For this reason evaluation of subarachnoid  hemorrhage is precluded. There is no intracranial mass effect, shift  or herniation. No definite areas of hypoattenuation or sulcal  effacement to suggest brain edema. There is no hydrocephalus. No  extra-axial subdural or epidural collections. Mild inflammatory  mucosal thickening of ethmoid air cells. The remainder of the  paranasal sinuses and mastoid air cells are clear. Skull base and  calvarium are intact.      Impression    IMPRESSION: Given presence of contrast within the vessels from prior  intravenous contrast administration, evaluation for subarachnoid  hemorrhage is precluded. No evidence of mass effect or herniation. No  definite evidence of brain edema at this time. Follow-up CT can be  obtained.    I have personally reviewed the examination and initial interpretation  and I agree with the findings.    VIVIENNE ARAIZA MD   CT Chest Abdomen Pelvis w/o Contrast    Narrative    EXAMINATION: CT of the Chest, Abdomen and Pelvis on 4/11/2020.    INDICATION: ECMO     COMPARISON: None     TECHNIQUE: Axial images of the chest, abdomen and pelvis were obtained  without contrast. Coronal reconstructions were provided. Images were  reviewed in bone, lung, and soft tissue windows.    Dose: 1746 mGy*cm    FINDINGS:    Chest:    Thyroid is grossly unremarkable. Endotracheal tube with tip projecting  at the mid thoracic trachea. Normal great vessels off the thoracic  aorta. The heart is mildly enlarged. Postsurgical changes of coronary  artery stenting. Trace pericardial effusion. No  appreciable  mediastinal, hilar or axillary lymphadenopathy. Intra-arterial balloon  pump with tip projecting at the distal aortic arch.    The central tracheobronchial tree is clear.  No pleural effusion.  Bibasilar atelectatic changes. Diffuse intralobular septal thickening.  Numerous bilateral mixed solid and groundglass pulmonary nodules with  apical predominance. For example:  -Right upper lobe 8 mm groundglass pulmonary nodule (series 2 image  46)  -Left upper lobe 8 mm solid pulmonary nodule (series 2 image 113)  -Right upper lobe 6 mm solid pulmonary nodule (series 2 image 101).    Abdomen and Pelvis:    Findings: Right inferior approach ECMO cannula with tip projecting at  the superior cavoatrial junction. Intra-aortic balloon pump with  inferior marker just below the renal arteries and superior marker at  the aortic arch. Left lower extremity approach central venous catheter  with tip projecting at the inferior cavoatrial junction. Right  arterial ECMO cannula with tip projecting at the aortic bifurcation.  Left lower approach aortic balloon pump. Enteric tube with tip and  side-port project in the stomach.    The liver parenchyma appears homogenous hepatic steatosis. No  appreciable focal hepatic lesion. The gallbladder is nondistended. No  cholelithiasis. No intrahepatic or extrahepatic biliary duct dilation.  The spleen is grossly unremarkable. The pancreas is within normal  limits. Bilateral adrenal glands are unremarkable.    Mildly contrast enhancing kidneys, likely secondary to recent  angiogram procedure.  No hydronephrosis. No hydroureter. The bladder  is decompressed with Marin catheter in place. No pelvic masses.    The large and small bowel are normal in caliber. No abnormal contrast  enhancement wall thickening. The appendix is grossly unremarkable.  Stomach is within normal limits.    No appreciable intra-abdominal lymphadenopathy by size criteria. Small  hematomas noted in bilateral inguinal  regions. Otherwise soft tissues  are grossly unremarkable.    No acute osseous lesions. Sternal fracture with 9.7 cm retrosternal  hematoma in the craniocaudal dimension. Mild degenerative changes of  the thoracolumbar spine. Fracture left sixth rib.       Impression    IMPRESSION:  1. Stable support devices as above.  2. 9.7 cm retrosternal hematoma and sternal fracture.  3. Pulmonary edema. Multiple groundglass nodules, predominantly in the  upper lobe. May be infectious versus reactive.   4. Bilateral small hematomas in the inguinal region.  5. Fractured left anterior sixth rib.    I have personally reviewed the examination and initial interpretation  and I agree with the findings.    RONNI EL MD   XR Abdomen Port 1 View    Narrative    XR ABDOMEN PORT 1 VW on 4/11/2020 2:52 PM.    INDICATION: verify OG.    COMPARISON: Same-day CT.    FINDINGS:   Portable AP supine radiograph of the abdomen. New enteric tube tip and  sidehole project over the stomach. ECMO cannulae. Inferior approach  central venous catheter. Nonobstructive bowel gas pattern. No  pneumatosis or portal venous gas.      Impression    IMPRESSION:   New enteric tube tip and sidehole projected over the stomach.     I have personally reviewed the examination and initial interpretation  and I agree with the findings.    RONNI EL MD   XR Chest Port 1 View    Narrative    EXAMINATION: XR CHEST PORT 1 VW, 4/11/2020 2:53 PM    INDICATION: Check endotracheal tube placement and ECLS cannula  placement. DO NOT log-roll patient.  Place film under patient using  patient safety handling process.    COMPARISON: Chest CT same day    FINDINGS: Single AP supine radiograph of chest    Endotracheal tube projects in the mid thoracic trachea. Heart is  mildly enlarged. Bilateral upper lobe prominent hazy opacities. No  pleural effusion. No pneumothorax. Enteric tube with tip collimated  off the field-of-view. Partially visualized inferior approach  right  ECMO cannula with tip projecting at the lower SVC. Intra-arterial  balloon pump noted adjacent to the T6 vertebral body.      Impression    IMPRESSION:  1. Endotracheal tube in adequate position.  2. Multiple apical predominant hazy opacities. May be infectious  versus edema.    I have personally reviewed the examination and initial interpretation  and I agree with the findings.    RONNI EL MD   XR Chest Port 1 View    Narrative    XR CHEST PORT 1 VW4/12/2020 1:20 AM    INDICATION: Check endotracheal tube placement and ECLS cannula  placement. DO NOT log-roll patient.  Place film under patient using  patient safety handling process.    COMPARISON:  4/11/2020    FINDINGS: AP view of the chest. Medical device projects over the upper  mediastinum. Endotracheal tube in the mid thoracic trachea. ECMO  cannula near the cavoatrial junction. IABP tip at the level of the  julia. Esophageal temperature probe. Enteric tube tip in the stomach.    Cardiac silhouette is stably enlarged. Slightly low lung volumes  compared with the prior. Slight diffuse interstitial opacities. No  pleural effusion or pneumothorax. Upper abdomen is unremarkable. Bony  lesions are stable.      Impression    IMPRESSION:   1. Support devices in stable position.  2. Slightly low lung volumes with similar to slightly improved hazy  opacities in the upper lobes.    I have personally reviewed the examination and initial interpretation  and I agree with the findings.    CYRUS JOSE MD   US Lower Extremity Arterial Duplex Bilateral    Narrative    Exam: Duplex ultrasound of bilateral lower extremity arteries dated  4/12/2020 6:50 AM     Clinical information: Baseline to assess blood flow to Lower  Extremities (VA ECMO)     Comparison: None    Technique: Includes Gray Scale images, color Doppler, spectral Doppler  waveforms and velocities with appropriate angles of 60 degrees or  less.    Findings:     Right lower extremity:      From the common femoral artery to the superficial femoral artery the  arteries are not visualized due to the presence of the ECMO catheter.    Mid SFA: 64. Waveforms: Biphasic  Distal SFA: 98. Waveforms: Biphasic  Popliteal artery: 41. Waveforms: Biphasic  PTA ankle: 54. Waveforms: Biphasic  CARMINA ankle: 88. Waveforms: Biphasic    Left lower extremity:    From the common femoral artery to the mid superficial femoral artery  flow signal visualized due to the presence of the IABP as well as the  CVC at the femoral vein.  Mid SFA: 46. Waveforms: Biphasic  Distal SFA: 67. Waveforms: Biphasic  Popliteal artery: 34. Waveforms: Biphasic  PTA ankle: 34. Waveforms: Biphasic  CARMINA ankle: 24. Waveforms: Biphasic      Impression    Impression:     1. Right leg: Patent vasculature distal to the ECMO cannula with  biphasic waveforms.  2. Left leg: Patent vasculature distal to the IABP and central venous  catheters with biphasic waveforms.    Guidelines:  [facility] duplex criteria for lower limb arterial  occlusive disease  -Percent stenosis- Normal (1-19%): Peak systolic velocity (cm/s):  <150, End-diastolic velocity (cm/s): <40, Velocity ration (Vr): <1.5,  Distal arterial waveform: Triphasic  -Percent stenosis- 20-49%: Peak systolic velocity (cm/s): 150-200,  End-diastolic velocity (cm/s): <40, Velocity ration (Vr): 1.5-2.0,  Distal arterial waveform: Triphasic  -Percent stenosis- 50-75%: Peak systolic velocity (cm/s): 200-300,  End-diastolic velocity (cm/s): <90, Velocity ration (Vr): 2.0-3.9,  Distal arterial waveform: Poststenotic turbulence distal to stenosis,  monophasic distal waveform  -Percent stenosis- >75%: Peak systolic velocity (cm/s): >300,  End-diastolic velocity (cm/s): <90, Velocity ration (Vr): >4.0, Distal  arterial waveform: Dampened distal waveform and low PSV/EDV* in the  stenosis  -Percent stenosis- Occlusion: Absent flow by color Doppler/pulsed  Doppler spectral analysis; length of  occlusion estimated from distance  between exit and reentry collateral arteries  *PSV = peak systolic velocity, EDV = end-diastolic velocity  http://link.block.com/chapter/10.1007/[email or web address]_23/fulltext  html    I have personally reviewed the examination and initial interpretation  and I agree with the findings.    CYRUS JOSE MD       Labs:  Recent Labs   Lab 04/12/20  1129 04/12/20  0958 04/12/20  0813 04/12/20  0601   PH 7.28* 7.36 7.37 7.39   PCO2 41 41 42 41   PO2 92 62* 106* 149*   HCO3 19* 23 24 25   O2PER 40 40 40.0 40.0  40.0       Lab Results   Component Value Date    HGB 9.1 (L) 04/12/2020    PHGB 50 (H) 04/12/2020     04/12/2020    FIBR 186 (L) 04/12/2020    INR 1.44 (H) 04/12/2020     (HH) 04/12/2020    DD 2.7 (H) 04/12/2020    AXA 0.32 04/12/2020    ANTCH 54 (L) 04/12/2020         Plan is remain on VA ECMO .      Afshin Juarez, RT  4/12/2020 12:29 PM

## 2020-04-12 NOTE — PLAN OF CARE
Major Shift Events: Covid and MRSA negative, precautions removed. Pt transferred from 4A to 4E. Pt remains off pressors. Very little pulsatility noted when IABP paused. Junctional rhythm. Received 2u PRBC, 1L 5% Albumin and 12.5 g 25% albumin for chugging. Flows lowered to 3.9 to reduce chugging.Tongue lac continues to bleed a significant amount. Afrin ordered to help slow bleeding.  Significant bleeding from ECMO site and new hematoma at IABP site. Team notified. Sandbags placed. Hematoma improved. Pt continued to bleed at ECMO site. SVO2 in the mid 50's. Team notified.Target temp increased to 35 to improve bleeding and SVO2.   Plan: Continue plan of care. Rewarming to begin at 15:00  For vital signs and complete assessments, please see documentation flowsheets.

## 2020-04-12 NOTE — PHARMACY-VANCOMYCIN DOSING SERVICE
Pharmacy [facility] Dose Change Per Policy - VANCOMYCIN  Original Dose Ordered: 1500 mg IV q12h  Dose Changed To: 1750 mg IV q24h  This dose change was based on the pharmacist's assessment of this patient's age, weight, concurrent drug therapy, treatment goals, whether patient's creatinine clearance adequately indicates renal function (factoring in age, muscle mass, fluid and clinical status), and, if applicable, prior pharmacokinetic data.    Estimated Creatinine Clearance: 13.8 mL/min (A) (based on SCr of 1.61 mg/dL (H)).     Will continue to follow and modify dosage according to levels, organ function and clinical condition    Ofelia Pulido, Ginny  CVICU and Advanced Heart Failure Pharmacist  Pager [phone]

## 2020-04-13 NOTE — PROCEDURES
Procedure Date: 04/11/2020      PATIENT:  Dino Cutler      EEG #:  -1.      This is day 1 of 24-hour video EEG.      DATE OF RECORDING/SERVICE DATE:  04/11/2020.      SOURCE FILE DURATION:  5 hours, 1 minute, 23 seconds       CLINICAL SUMMARY:  Dino Cutler is a 56-year-old male with past medical history of hypertension and ethanol abuse who presented as VF cardiac arrest, was resuscitated and transferred to [facility].  EEG was performed to evaluate for seizures.     MEDICATIONS:  Fentanyl drip 100 mcg per hour started at 14:49, Versed drip 4-8 mg per hour started at 15:08, propofol drip 20-30 mcg/kg per minute started at 14:10.    TECHNICAL SUMMARY: This continuous video- EEG monitoring procedure was performed with 23 scalp electrodes in 10-20 electrode system placement, and additional scalp, precordial and other surface electrodes used for electrical referencing and artifact detection.  Video monitoring was utilized and periodically reviewed by EEG technologists and the physician for electroclinical correlations.     INTERICTAL ACTIVITY:  No posterior dominant rhythm or sleep-wake cycling is appreciated.  Significant myogenic artifact is present throughout the recording.  There is significant suppression of the background with long stretches of the recording without any electrocerebral activities.  There were rare low amplitude activities in the delta range up to 5 microvolts.  No epileptiform discharges were present.  The patient was stimulated at approximately 19:04 per ICU protocol.  There was no clinical response or EEG reactivity.      CLINICAL/ICTAL EVENTS:  No electrographic or clinical seizures were recorded.      IMPRESSION:  This is a markedly abnormal video EEG due to the presence of significant suppression of the background.  No epileptiform discharges or seizures were recorded. Sedative medications could in part contribute to some of these findings; however, the degree of suppression is  beyond sedative medications and clinical correlation advised.           EUNICE GONZALES MD             D: 2020   T: 2020   MT: ELIZABETH      Name:     VIPIN JUAREZ   MRN:      [id number]        Account:        [id number]   :      1900           Procedure Date: 2020      Document: [id number]

## 2020-04-13 NOTE — PROGRESS NOTES
[facility]  Dr Jarrett  [facility] [address]

## 2020-04-13 NOTE — PROGRESS NOTES
[facility], [facility] Nurse Inpatient Adult Pressure Injury Prevention Assessment: ECMO  Initial     Positioning Tolerance: Good  Date of ECMO cannulation: 4/11/20  Presence of Ischemia: No    Pressure Injury Prevention Interventions In Place:  Z flow Positioner under head, Pillows for repositioning, TAPs Wedge Positioners in use, Heel off-loading boots and Mepilex Sacral Dressing   Current support surface: Standard  Low air loss mattress       Pressure Injury Prevention Interventions Added:  None        Plan of Care for Positioning and Pressure Injury Prevention  Reposition patient every 1-2 hours using TAP Wedges  Position head on Z flow positioner, mold indentation at areas of pressure points.  Pad ECMO IJ cannula with Optifoam (#[id number]) along face and scalp between skin and cannula and under Coban head wraps    Pad ECMO groin and chest cannula under rigid connectors with Optifoam or Soft cloth  Heel off-loading Boots at all times  Sacral Mepilex for Prevention, change every 5 days and prn  Low Air loss mattress    Patient History:   According to medical record: 55 y/o M with PMH of HTN, EtOH use who presented as VF cardiac arrest. Patient went to his girlfriend's house 4/11, she found him on the floor. She called 911 and started CPR.  He was VA cannulated 25F/17F,8F reperfusion cannula  and IABP placed.     Current Diet / Nutrition:     Orders Placed This Encounter      NPO for Medical/Clinical Reasons Except for: No Exceptions  Tube feeding    Output:    I/O last 3 completed shifts:  In: 4617.03 [I.V.:1937.03; NG/GT:280; IV Piggyback:1000]  Out: 970 [Urine:820; Emesis/NG output:150]  Containment: of urine/stool: Urinary Catheter    Risk Assessment:   Sensory Perception: 1-->completely limited  Moisture: 3-->occasionally moist  Activity: 1-->bedfast  Mobility: 1-->completely immobile  Nutrition: 1-->very poor  Friction and Shear: 1-->problem  Lan Score: 8    Labs:    Recent  Labs   Lab 04/13/20  1002 04/13/20  0346  04/11/20  1800   ALBUMIN 2.8* 3.1*   < >  --    HGB 7.9* 8.7*   < >  --    INR 1.45* 1.38*   < >  --    WBC 10.2 9.7   < >  --    A1C  --   --   --  5.1   CRP  --  180.0*   < >  --     < > = values in this interval not displayed.       Focused Assessment: right Right groin ECMO cannula    Pressure Injury Present::No  Discussed plan of care with Nurse  WOC Nurse follow-up plan:weekly

## 2020-04-13 NOTE — PROGRESS NOTES
[facility]    Patient Name:  Odilia Gusman  MRN:  [id number]    :  1900    Date of Admission:  2020  Date of Service:  2020             Assessment and Plan     #1 Concern for anoxic brain injury after cardiac arrest     Mr. Bundy is a gentleman who had a refractory cardiac arrest on . He is still on Levophed 0.02, Fentanyl 100 /hr and Versed 8 mg/hr. Pt was rewarmed to 37 degrees C w/ thermoguard; reached goal at 0100.      Recommendations:  - No new recommendations today    Please contact our service at [phone] with any questions or concerns.     Ama Perry MD  PGY 2  [phone]         Consult Question / Reason for Consult       Concern for anoxic brain injury after cardiac arrest          HPI     Mr. Bundy is a 56 year old gentleman who presented after a cardiac arrest. The arrest was witnessed and bystander CPR was performed. Rhythm was VF and the patient received 11 shocks and 3 rounds of epi. He was apparently on the Giovanni for over 1 hr. LAD disease was found in the cath lab and stent was placed. He is rewarmed today. He is on VA ECMO. Still on midazolam 8 and fentanyl 100.      CT head reveals possible loss of grey/white differentiation in the frontal lobes. EEG reveals severe suppression.     He has acute liver and kidney injuries, still worsening.     Home Medications:  No medications prior to admission.       Current Medications:  Current Facility-Administered Medications   Medication     albumin human 25 % injection 12.5 g     albumin human 5 % injection 25 g     albumin human 5 % injection 25 g     albuterol (PROAIR HFA/PROVENTIL HFA/VENTOLIN HFA) 108 (90 Base) MCG/ACT inhaler 6 puff     artificial tears ophthalmic ointment     aspirin (ASA) chewable tablet 81 mg     calcium chloride in  mL intermittent infusion 1 g     calcium chloride injection 1 g     dextrose 10% infusion     glucose gel 15-30 g    Or     dextrose  "50 % injection 25-50 mL    Or     glucagon injection 1 mg     EPINEPHrine (ADRENALIN) 5 mg in sodium chloride 0.9 % 250 mL infusion     fentaNYL (SUBLIMAZE) bolus from infusion pump-ADULT 50 mcg     fentaNYL (SUBLIMAZE) infusion     heparin 2 unit/mL in 0.9% NaCl (for REPERFUSION CATHETER)     heparin 25,000 units in 0.45% NaCl 250 mL ANTICOAGULANT  infusion     insulin 1 unit/mL in saline (NovoLIN, HumuLIN Regular) drip - ADULT IV Infusion     lactated ringers BOLUS 1,000 mL     lidocaine (LMX4) cream     lidocaine 1 % 0.1-1 mL     magnesium sulfate 2 g in water intermittent infusion     magnesium sulfate 4 g in 100 mL sterile water (premade)     Medication Considerations - To maintain platelet inhibition after discontinuation of cangrelor (KENGREAL) [see admin instructions]     midazolam (VERSED) drip - ADULT 100 mg/100 mL in NS PRE-MIX     midazolam (VERSED) injection 1-3 mg     midazolam (VERSED) injection 1-3 mg     naloxone (NARCAN) injection 0.1-0.4 mg     norepinephrine (LEVOPHED) 16 mg in  mL infusion     oxymetazoline (AFRIN) 0.05 % spray 2 spray     pantoprazole (PROTONIX) 40 mg IV push injection     piperacillin-tazobactam (ZOSYN) 3.375 g vial to attach to  mL bag     potassium chloride 20 mEq in 50 mL intermittent infusion     propofol (DIPRIVAN) infusion     sodium chloride (PF) 0.9% PF flush 3 mL     sodium chloride (PF) 0.9% PF flush 3 mL     sodium phosphate 10 mmol in D5W intermittent infusion     sodium phosphate 15 mmol in D5W intermittent infusion     sodium phosphate 20 mmol in D5W intermittent infusion     sodium phosphate 25 mmol in D5W intermittent infusion     ticagrelor (BRILINTA) tablet 90 mg     vancomycin (VANCOCIN) 1,750 mg in sodium chloride 0.9 % 250 mL intermittent infusion       Allergies: Not on File           Physical Examination      Weight:201 lbs .95 oz; Height:5' 10\"  Temp: 98.2  F (36.8  C) Temp src: Esophageal     Heart Rate: 108 Resp: 22 SpO2: 99 % O2 Device: " Mechanical Ventilator      General:  Patient lying in bed without any acute distress    HEENT:  EEG leads in place  Cardio:  Regular rate and rhythm   Pulmonary:  Intubated and mechanically ventilated   Extremities:  Extremity edema  Skin:  Warm/dry     Neurologic Examination:  Mental Status: Unresponsive to noxious stimulation.   Language: Nonverbal. Intubated.    Speech: Nonverbal. Intubated.    Cranial Nerves: Pupils right 5 mm and left is 4 mm ,non reactive to light bilaterally. Corneal reflex absent . Oculocephalic reflex absent.  Cough reflex absent.  Sensory/Motor: no response to pain in both UEs and LEs           Labs and Imaging Studies       Recent Labs   Lab Test 04/12/20  0336 04/11/20  2207 04/11/20  1625   * 147* 145*   POTASSIUM 4.0 3.9 4.0   CHLORIDE 113* 113* 112*   CO2 25 26 23   ANIONGAP 8 9 10   * 106*  112* 172*  179*   BUN 25 22 19   CR 1.61* 1.50* 1.18   COLEEN 6.8* 7.0* 7.2*   WBC 6.7 9.3 13.4*   RBC 2.65* 2.61* 3.80*   HGB 8.5* 8.4* 12.1*    210 325       Recent Labs   Lab 04/13/20  0559 04/13/20  0400 04/13/20  0141 04/12/20  2340   PH 7.38 7.37 7.38 7.36   PCO2 41 44 42 44   PO2 118* 189* 130* 203*   HCO3 25 25 25 25   O2PER 40 40 40 40       Recent Labs   Lab 04/13/20  0558 04/13/20  0400 04/13/20  0350 04/13/20  0346 04/13/20  0141 04/12/20  2309 04/12/20  2152 04/12/20  2140 04/12/20  2112  04/12/20  1556  04/12/20  0958  04/12/20  0336   GLC  --  118*  --  112*  --   --  126*  127*  --   --   --  134*  146*  --  169*  168*  --  122*   *  --  118*  --  108* 115*  --  119* 125*   < >  --    < >  --    < >  --     < > = values in this interval not displayed.

## 2020-04-13 NOTE — PHARMACY-VANCOMYCIN DOSING SERVICE
Pharmacy [facility] Dose Change Per Policy - vancomycin  Original Dose Ordered: 1750 mg IV q24 hours  Dose Changed To: intermittent dosing  This dose change was based on the pharmacist's assessment of this patient's age, weight, concurrent drug therapy, treatment goals, whether patient's creatinine clearance adequately indicates renal function (factoring in age, muscle mass, fluid and clinical status), and, if applicable, prior pharmacokinetic data.      Estimated Creatinine Clearance: 39 mL/min (A) (based on SCr of 2.46 mg/dL (H)).  Will continue to follow and modify dosage according to levels, organ function and clinical condition    Tejinder Acuña, DennisD

## 2020-04-13 NOTE — PLAN OF CARE
Major Shift Events:    -0815 updated Dr. Perez and also the neurology team in regards to pupils being fixed.  Right eye around 4-5 mm and round, left eye 3 mm and oval.  No new orders given.  CT in the AM  -1000 Updated during rounds that there was some blood in the OG.  Dark in color.  No further orders given.  Pt. On protonix already.  Informed Dr. Rodriguez in regards to the S100B lab being missed the other day.  Did not want me to draw one today.   -1700 Informed Dr. Perez in regards to increase lactic acid up to 3.2 and Maps on the IABP around .  Request to call the team and inform them if maps on the IABP consistently remain above 100 to start hydralazine or something similar.  Epi gtt to remain on to help contractility.      Discussed plan of care with daughter and son.  They requested to be called during rounds.  Team informed.  Request to keep versed on from the team secondary to ETOH history.  Right groin with a slow oozing of blood.  5 lb sand  bag to assist with hemostasis.  No blood products given this shift. No turn down with ECO today, possibly tomorrow.   Plan: continue to monitor VS, trend labs.  For vital signs and complete assessments, please see documentation flowsheets.

## 2020-04-13 NOTE — PROGRESS NOTES
Trial: Advanced REperfusion STrategies for Refractory Cardiac Arrest (The ARREST Trial)    Continued Participation Consent after EFIC    IRB Number: [id number]    PI: Natalio Damon -[id number]    Primary Study Coordinators:  Ameena Hodge RN (p) [phone] or Blaire Short RN (p) [phone]  Estimated duration of study: 6 months    LAR (Gumaro, son) and daughter Octavia was informed of enrollment and randomized arm of Early ECMO to above study.  An explanation of requirements for meeting the criteria for this under FDA guidelines for EFIC (exception to informed consent) were explained. Continued participation in the above study was discussed with the current IRB approved consent form and was reviewed at length with the LAR (Gumaro, son).  This included purpose, nature of the research, risks & benefits, confidentiality, randomization arms (Early ECMO vs ACLS) as well as the 3 and 6 month follow up.  LAR was informed that continued participation is voluntary and that refusal to participate or withdraw will involve no penalty or decrease benefits to which the subject is otherwise entitled. LAR have had the opportunity to read the entire written consent, ask questions and concerns of the study investigator/personnel and offered sufficient time to consider the trial.  LAR was able to clearly state what study participation involved and the associated requirements.  All questions and concerns were addressed. LAR voluntarily signed the consent and HIPAA forms on DATE: 4/13/20 @ 2712 an copy was already sent via email.

## 2020-04-13 NOTE — PROGRESS NOTES
"CLINICAL NUTRITION SERVICES - ASSESSMENT NOTE     Nutrition Prescription    Recommendations:  Add bowel regimen     Malnutrition Status:    Unable to determine due to unable to assess all nutrition parameters at this time.     Interventions ordered by Registered Dietitian (RD):  - Initiate Impact Peptide 1.5 @ 15 ml/hr via post-pyloric FT. Advance by 15 ml q8h to goal, Impact Peptide 1.5 @ 60 ml/hr    - FWF of 30 ml q4h for tube patency   - Continue folic acid, thiamine and certavite daily     Goal: Impact Peptide @ goal 60 ml/hr (1440 ml/day) to provide 2160 kcals (27 kcal/kg/day), 135 g PRO (1.7 g/kg/day), 1109 ml free H2O, 92 g Fat (50% from MCTs), 202 g CHO and no Fiber daily.    Future recommendations   If renal formula indicated, recommend Nepro @ 50 ml/hr + Prosource (1 pkt BID) to provide 2240 kcals (28 kcal/kg/day), 119 g PRO (1.5 g/kg/day), 876 ml free H2O, 193 g CHO and 15 g Fiber daily.     REASON FOR ASSESSMENT  Dino Cutler is a 54 year old male assessed for Provider Order - RD to Assess and Order TF per Medical Nutrition Therapy Protocol    NUTRITION HISTORY  Unable to obtain nutrition history at this time. Per chart review, h/o alcohol use.    CURRENT NUTRITION ORDERS  Diet: NPO x 2 days    LABS  Labs reviewed  Cr 2.23  K 4.2   Phos 4.4        MEDICATIONS  Medications reviewed  Epinephrine   Folic acid (1 mg)  Thiamine (200 mg)   Certavite      ANTHROPOMETRICS  Height: 177.8 cm (5' 10\")  Most Recent Weight: 91.2 kg (201 lb 1 oz)    IBW: 75.5 kg  BMI: Overweight BMI 25-29.9  Weight History:   Wt Readings from Last 10 Encounters:   04/13/20 91.2 kg (201 lb 1 oz)       Dosing Weight: 79 kg (adjusted, based on IBW of 75.5 kg and admit weight of 90.9 kg on 4/11)    ASSESSED NUTRITION NEEDS  Estimated Energy Needs: 2000 - 2400 kcals/day (25 - 30 kcals/kg)  Justification: Maintenance  Estimated Protein Needs: 100 - 140+ grams protein/day (1.3 - 1.8+ grams of pro/kg)  Justification: Increased needs " pending renal function   Estimated Fluid Needs: 2000 - 2400 mL/day (1 mL/kcal)   Justification: Maintenance    PHYSICAL FINDINGS  See malnutrition section below.  Unable to obtain nutrition focused physical assessment to limit exposure and to minimize use of PPE. Will obtain per MD request.     MALNUTRITION  % Intake: Unable to assess  % Weight Loss: Unable to assess  Subcutaneous Fat Loss: Unable to assess  Muscle Loss: Unable to assess  Fluid Accumulation/Edema: Unable to assess  Malnutrition Diagnosis: Unable to determine due to unable to assess all nutrition parameters at this time.     NUTRITION DIAGNOSIS  Inadequate protein-energy intake related to NPO without nutrition support as evidenced by no nutrition intake x2 days       INTERVENTIONS  Implementation  Collaboration with other providers - Plan to start feeding 4/14   Enteral Nutrition - Initiate     Goals  Total avg nutritional intake to meet a minimum of 25 kcal/kg and 1.5 g PRO/kg daily (per dosing wt 79 kg).     Monitoring/Evaluation  Progress toward goals will be monitored and evaluated per protocol.    Anastasia Faust RD, LD  [id number]  Pgr: [phone]

## 2020-04-13 NOTE — PROGRESS NOTES
CARDIOLOGY-CSI PROGRESS NOTE  Odilia Gusman MRN:[id number] YOB: 1900  Date of Admission:4/11/2020    ASSESSMENT AND PLAN:   Patient Dino Bundy is a 57 y/o M with PMH of HTN, EtOH use who presented as VF cardiac arrest. Patient went to his girlfriend's house this morning (he biked there) and they decided to sit on the porch. While she was making coffee she heard him calling her and she found him on the floor. She called 911 and started CPR. EMS then arrived and transferred him to [facility]. He was VA cannulated 25F/17F ,8F reperfusion cannula  and IABP placed.     Plan for today (4/13):  -EPI ggt for added pulsatility  -Digoxin 500 mcg IV x2  by 6 hrs  -Place NJ tube, start feeds on 4/14        Neurology: # At risk for hypoxic brain injury  # Alcohol use disorder   Intubated, sedated, not paralyzed. Cooled to 34 degrees.  --continue versed 8 , fentanyl 100, propofol weaned off   - RASS goal -4 to -5  - CTH without ICH (4/11)  - neurocritical care following   - veeg when ruled out of COVID    - will monitor for alochol withdrawal whenever we wean of benzos, propofol   Cardiovascular / Hemodynamics: Refractory VF arrest.  MONTEZ to LAD.  Peripheral V-A ECMO inserted for VF arrest. LA 10.8   S/p proximal LAD stent (3.0x28mm synergy MONTEZ )for subtotal occlusion of pLAD.   ECMO: flow 4.7 L, sweep 2 , FiO2 = 70%   TTE: pericardial effusion, EF=5%, underfilled cavities (4/12)   EKG: sinus rhythm with PVC, 1mm st depression on V1   --start EPI on 4/13 for pulsatility  --continue ASA 81mg and ticagrelor 90mg BID  --ACT goal 160-180  --hold lipitor for now given likely hepatic injury during arrest  --hold ACE/ARB for now given likely reduced renal fxn after arrest  --holding beta blocker given shock    Pulmonary: # risk for aspiration pneumonia  # intubated for airway protection in setting of cardiac arrest   Vent Settings: FiO2 60% , PEEP 7   ETT in the midthoracic trachea .  Now weaning vent  requirements.  Overbreathing vent ; tachypnoic today   CXR: Lines in stable position. Hazy upper lobe opacities   --wean vent as able  --daily CXR  Chest CT  --Q2h ABGs for now  --consider scheduled duonebs if signs of lung dz, currently PRN     GI and Nutrition: No known medical hx.   Transaminitis likely shock liver   EtOH use : thiamine, folate, MVD  --monitor BID LFTs  --Place NJ on 4/13, then feed on 4/14  --bowel regimen - on hold for now due to hypothermia  --GI Prophylaxis: PPI    Renal, Fluid and Electrolytes: Acute kidney injury   Likely ATN during arrest   Cr 1.14. UOP 0.6 L   --monitor urine output  --maintain K>3 and Mg>2    Infectious Disease: No signs of infection. Leukocytosis c/w arrest. Blood cultures collected.   --vancomycin/zosyn x5 days for ECMO  --daily blood cultures  --monitor for signs of infection given cooling, lines, and leukocytosis   Hematology and Oncology: Receiving heparin for ECMO and ASA/ticagrelor for MONTEZ.   --cryo PRN fibrinogen < 200; FFP for INR >2  --Transfuse for Hgb<10  --heparin gtt for ECMO with ACT goal 160-180  --US LE w/ arterial duplex per ECMO protocol   --DVT PPX: Heparin as above   Endocrinology: No known medical history. BG elevated.  --insulin gtt  --no stress dose steroids due to persistent hypotension - hydrocort mg IV Q8h  --f/u HgbA1c    Lines: PA catheter April 11, 2020  R femoral arterial and venous ECMO cannulae April 11, 2020  L femoral arterial line April 11, 2020  R radial arterial line April 11, 2020  ETT April 11, 2020  Marin catheter April 11, 2020  OG tube April 11, 2020  Restraint: needed    Current lines are required for patient management       Family update by me today: Yes     Code Status: FULL CODE    The pt was discussed and evaluated with Dr. Harrison, Cardiologist, Dr Damon, attending physician, who agrees with the assessment and plan above.     Mahesh Perez M.D.  [facility]  Cardiology  [phone]      HISTORY  OF PRESENT ILLNESS:  Patient Dino Bundy is a 55 y/o M with PMH of HTN, EtOH use who presented as VF cardiac arrest. Patient went to his girlfriend's house this morning (he biked there) and they decided to sit on the porch. While she was making coffee she heard him calling her and she found him on the floor. She called 911 and started CPR. EMS then arrived and transferred him to [facility]. Patient did not complaint of any symptoms before the arrest    Radha Londonomehrdad: [phone]  Gumaro (son): [phone]  Rosa (mother):     Had chest pain on Tuesday night.  Had high blood pressure ; was not treated for that , has not seem physician for years. His BP was around 190-220 routinely.   He was recently in [place]      Witnessed arest (y/n): y  Home or public location (y/n): home  Bystander CPR (y/n): y  Time of 911 call:   Initial rhythm: VF  Did they have intermittent ROSC (y/n): n  # of shocks 11  Epi:  3 mg  Amio: 450 mg  Bicarb:  1 amps  EtCO2 en route: 38 (here in cath lab)   O2 sat en route:    Initial rhythm in the cath lab: vf  First AB.07 / 59/ 51/ 17 (Fio2 = 100%)  ECMO cannula size:   Meds given in the cath lab:      PAST MEDICAL HISTORY:  HTN  Alcohol use     Past surgical history:  Unable to obtain due to patient's condition    Family history:  Unable to obtain due to patient's condition    Social history  Alcohol use     MEDICATIONS:  PTA Meds  Prior to Admission medications    Not on File      Current Meds    albumin human  25 g Intravenous Once     artificial tears   Both Eyes Q8H     aspirin  81 mg Per Feeding Tube Daily     digoxin  500 mcg Intravenous Q6H     [START ON 2020] folic acid  1 mg Oral Daily     folic acid  1 mg Intravenous Once     [START ON 2020] folic acid  1 mg Intravenous Daily     lactated ringers  1,000 mL Intravenous Once     lidocaine  5 mL Topical Once     multivitamin w/minerals  1 tablet Oral Daily     oxymetazoline  2 spray Topical BID     pantoprazole (PROTONIX) IV   40 mg Intravenous Daily     piperacillin-tazobactam  3.375 g Intravenous Q6H     sodium chloride (PF)  3 mL Intracatheter Q8H     thiamine  200 mg Intravenous TID     ticagrelor  90 mg Oral BID     vancomycin (VANCOCIN) IV  1,750 mg (central catheter) Intravenous Q24H     [START ON 4/15/2020] thiamine  100 mg Oral TID     [START ON 4/20/2020] thiamine  100 mg Oral Daily     Infusion Meds    dextrose       EPINEPHrine IV infusion ADULT       EPINEPHrine IV infusion ADULT Stopped (04/12/20 1324)     fentaNYL 100 mcg/hr (04/13/20 0900)     heparin 2 unit/mL in 0.9% NaCl 3 mL/hr (04/13/20 0900)     HEParin 800 Units/hr (04/13/20 0900)     insulin (regular) 0.5 Units/hr (04/13/20 0900)     - MEDICATION INSTRUCTIONS -       midazolam 4 mg/hr (04/13/20 0929)     norepinephrine Stopped (04/13/20 0929)     propofol (DIPRIVAN) infusion Stopped (04/12/20 1310)       ALLERGIES:    Not on File    REVIEW OF SYSTEMS:  A 10 point review of systems was negative except as noted above.    SOCIAL HISTORY:   Social History     Socioeconomic History     Marital status: Not on file     Spouse name: Not on file     Number of children: Not on file     Years of education: Not on file     Highest education level: Not on file   Occupational History     Not on file   Social Needs     Financial resource strain: Not on file     Food insecurity     Worry: Not on file     Inability: Not on file     Transportation needs     Medical: Not on file     Non-medical: Not on file   Tobacco Use     Smoking status: Not on file   Substance and Sexual Activity     Alcohol use: Not on file     Drug use: Not on file     Sexual activity: Not on file   Lifestyle     Physical activity     Days per week: Not on file     Minutes per session: Not on file     Stress: Not on file   Relationships     Social connections     Talks on phone: Not on file     Gets together: Not on file     Attends Yarsani service: Not on file     Active member of club or organization: Not  "on file     Attends meetings of clubs or organizations: Not on file     Relationship status: Not on file     Intimate partner violence     Fear of current or ex partner: Not on file     Emotionally abused: Not on file     Physically abused: Not on file     Forced sexual activity: Not on file   Other Topics Concern     Not on file   Social History Narrative     Not on file         FAMILY MEDICAL HISTORY:   No family history on file.      PHYSICAL EXAM:      Temp 98.2  F (36.8  C)   Resp 22   Ht 1.778 m (5' 10\")   Wt 91.2 kg (201 lb 1 oz)   SpO2 100%   BMI 28.85 kg/m        GENERAL APPEARANCE: intubated, sedated   Head: NC/AT  EYES: PERRL, pupils equal  HENT: Mouth without ulcers or lesions  NECK: Supple, no goiter  Pulmonary: Lungs clear to auscultation with equal breath sounds bilaterally, no clubbing or cyanosis  CV: Regular rhythm, normal rate, no rub.  GI: Soft, nontender, normal bowel sounds, no HSM   MS: No evidence of inflammation in joints, no muscle tenderness  SKIN: No rash, warm, drym ECMO cannulas in R femoral artery, vein , IABP in L femoral groin  NEURO: sedated     LABS:   CMP  Recent Labs   Lab 04/13/20  0400 04/13/20  0346 04/12/20  2152 04/12/20  1556 04/12/20  0958 04/12/20  0336   NA  --  149* 146* 147* 147* 146*   POTASSIUM  --  4.7 4.7 4.4 4.2 4.0   CHLORIDE  --  116* 114* 113* 113* 113*   CO2  --  25 25 22 23 25   ANIONGAP  --  8 6 12 11 8   * 112* 126*  127* 134*  146* 169*  168* 122*   BUN  --  36* 34* 31* 28 25   CR  --  2.46* 2.18* 2.16* 1.83* 1.61*   GFRESTIMATED  --  18* 21* 21* 26* 30*   GFRESTBLACK  --  21* 24* 24* 30* 35*   COLEEN  --  7.7* 7.0* 7.4* 7.0* 6.8*   MAG  --  2.3 1.7 1.6 1.9 1.9   PHOS  --  6.6*  --   --   --  4.6*   PROTTOTAL  --  4.8* 4.9* 4.8* 4.7* 5.0*   ALBUMIN  --  3.1* 3.2* 2.9* 3.0* 3.4   BILITOTAL  --  1.2 1.0 0.9 1.2 0.9   ALKPHOS  --  26* 22* 24* 25* 26*   AST  --  1,162* 1,206* 1,376* 1,575* 1,510*   ALT  --  175* 177* 200* 219* 215*     CBC  Recent " Labs   Lab 04/13/20  0346 04/12/20 2152 04/12/20  1556 04/12/20  0958   HGB 8.7* 8.8* 8.1* 9.1*   WBC 9.7 9.8 11.4* 10.3   RBC 2.71* 2.73* 2.53* 2.81*   HCT 25.6* 25.9* 24.8* 27.0*   MCV 95 95 98 96   MCH 32.1 32.2 32.0 32.4   MCHC 34.0 34.0 32.7 33.7   RDW 15.2* 14.7 14.8 14.2   * 125* 155 193     INR  Recent Labs   Lab 04/13/20  0346 04/12/20 2152 04/12/20  1556 04/12/20  0958   INR 1.38* 1.44* 1.46* 1.44*   PTT 90* 107* 94* 119*     ABG  Recent Labs   Lab 04/13/20  0811 04/13/20  0559 04/13/20  0400 04/13/20  0141   PH 7.40 7.38 7.37 7.38   PCO2 41 41 44 42   PO2 109* 118* 189* 130*   HCO3 25 25 25 25   O2PER 40 40 40 40        IMAGING:                  I have seen and examined the patient with the CSI team. I agree with the assessment and plan of the note above.I have reviewed pertinent labs.     Natalio Damon MD  Interventional Cardiology  Pager: [phone]

## 2020-04-13 NOTE — PROGRESS NOTES
Lactic acid normalized this AM at 1.8. Levophed remains on 0.02 mcg/kg/min. Fentanyl @ 100 mcg/hr and Versed @ 8 mg/hr. Bleeding noted near right femoral cannulation site. Changed Ioban dressing x2. Pt was chugging on ECMO and received 500 ml Albumin and 1 unit PRBC. Pt was rewarmed to 37 degrees C w/ thermoguard; reached goal at 0100.     Continue to monitor closely and notify [facility] w/ any changes.

## 2020-04-13 NOTE — PHARMACY-CONSULT NOTE
Pharmacy Tube Feeding Consult    Medication reviewed for administration by feeding tube and for potential food/drug interactions.    Recommendation: No changes are needed at this time.     Pharmacy will continue to follow as new medications are ordered.    Aide Markham RP

## 2020-04-13 NOTE — PROGRESS NOTES
ECMO Shift Summary: 1900-0700      ECMO Equipment:  Console serial number: [id number]   Circuit Lot number: Cardiohelp [id number]  Oxygenator Lot number: [id number]        Patient remains on VA ECMO, all equipment is functioning and alarms are appropriately set. RPM's 3800 with flow range 4.4-4.6 L/min. Sweep gas is at 4 LPM and FiO2 90%. Circuit remains free of air, clot and fibrin. Cannulas are secure with mild/moderate bleeding from site. Extremities are perfused. Suctioned ETT for moderate amount thin.    Significant Shift Events: None    Vent settings:  Ventilation Mode: CMV/AC  (Continuous Mandatory Ventilation/ Assist Control)  FiO2 (%): 40 %  Rate Set (breaths/minute): 14 breaths/min  Tidal Volume Set (mL): 450 mL  PEEP (cm H2O): 7 cmH2O  Oxygen Concentration (%): 40 %  Resp: 26  .    Heparin is running at 800 u/hr, ACT range 146-159.    Blood loss was small. Product given included 1 PRBC and 500ml Albumin.      Intake/Output Summary (Last 24 hours) at 4/13/2020 0532  Last data filed at 4/13/2020 0500  Gross per 24 hour   Intake 4527.73 ml   Output 945 ml   Net 3582.73 ml       ECHO:  No results found for this or any previous visit.No results found for this or any previous visit.    CXR:  Recent Results (from the past 24 hour(s))   US Lower Extremity Arterial Duplex Bilateral    Narrative    Exam: Duplex ultrasound of bilateral lower extremity arteries dated  4/12/2020 6:50 AM     Clinical information: Baseline to assess blood flow to Lower  Extremities (VA ECMO)     Comparison: None    Technique: Includes Gray Scale images, color Doppler, spectral Doppler  waveforms and velocities with appropriate angles of 60 degrees or  less.    Findings:     Right lower extremity:     From the common femoral artery to the superficial femoral artery the  arteries are not visualized due to the presence of the ECMO catheter.    Mid SFA: 64. Waveforms: Biphasic  Distal SFA: 98. Waveforms: Biphasic  Popliteal artery: 41.  Waveforms: Biphasic  PTA ankle: 54. Waveforms: Biphasic  CARMINA ankle: 88. Waveforms: Biphasic    Left lower extremity:    From the common femoral artery to the mid superficial femoral artery  flow signal visualized due to the presence of the IABP as well as the  CVC at the femoral vein.  Mid SFA: 46. Waveforms: Biphasic  Distal SFA: 67. Waveforms: Biphasic  Popliteal artery: 34. Waveforms: Biphasic  PTA ankle: 34. Waveforms: Biphasic  CARMINA ankle: 24. Waveforms: Biphasic      Impression    Impression:     1. Right leg: Patent vasculature distal to the ECMO cannula with  biphasic waveforms.  2. Left leg: Patent vasculature distal to the IABP and central venous  catheters with biphasic waveforms.    Guidelines:  [facility] duplex criteria for lower limb arterial  occlusive disease  -Percent stenosis- Normal (1-19%): Peak systolic velocity (cm/s):  <150, End-diastolic velocity (cm/s): <40, Velocity ration (Vr): <1.5,  Distal arterial waveform: Triphasic  -Percent stenosis- 20-49%: Peak systolic velocity (cm/s): 150-200,  End-diastolic velocity (cm/s): <40, Velocity ration (Vr): 1.5-2.0,  Distal arterial waveform: Triphasic  -Percent stenosis- 50-75%: Peak systolic velocity (cm/s): 200-300,  End-diastolic velocity (cm/s): <90, Velocity ration (Vr): 2.0-3.9,  Distal arterial waveform: Poststenotic turbulence distal to stenosis,  monophasic distal waveform  -Percent stenosis- >75%: Peak systolic velocity (cm/s): >300,  End-diastolic velocity (cm/s): <90, Velocity ration (Vr): >4.0, Distal  arterial waveform: Dampened distal waveform and low PSV/EDV* in the  stenosis  -Percent stenosis- Occlusion: Absent flow by color Doppler/pulsed  Doppler spectral analysis; length of occlusion estimated from distance  between exit and reentry collateral arteries  *PSV = peak systolic velocity, EDV = end-diastolic velocity  http://link.block.com/chapter/10.1007/[email or web address]_23/fulltext  html    I have personally  reviewed the examination and initial interpretation  and I agree with the findings.    CYRUS JOSE MD   Echo Limited    Narrative    [id number]  [id number]  [id number]  [id number]^IVANA^KARTHIKEYAN           [facility],[place]  Echocardiography [facility]  [address] [place] [address]     Name: VIPIN JUAREZ  MRN: [id number]  : 1900  Study Date: 2020 10:29 AM  Age: 89 yrs  Gender: Male  Patient Location: [facility]  Reason For Study: Cardiac Arrest  Ordering Physician: KARTHIKEYAN HEATH  Performed By: Floridalma Pulido RDCS     BSA: 2.1 m2  Height: 70 in  Weight: 200 lb  HR: 56  BP: 81/41 mmHg  _____________________________________________________________________________  __        Procedure  Limited Portable Echo Adult. Contrast Optison. Optison (NDC #2072-0432-77)  given intravenously. Patient was given 6 ml mixture of 3 ml Optison and 6 ml  saline. 3 ml wasted.  _____________________________________________________________________________  __        Interpretation Summary  Technically difficult study limited views obtained due to lung interference.  Patient on VA ECMo with 4.1LPM support and IABP at 1:1.     Both ventricles appear underfilled and with small cavity size. note that IV  contrast given did not reach the LV. Based on limited visualization  biventricular function is severely reduced, LV function estimated at 5%  The AV can not be assessed completely but is opening partially with each  systole.  There is a moderate sized pericardial effusion measured at around 1.8-2.0cm,  circumferential.     There is no prior study for direct comparison.  _____________________________________________________________________________  __        Aortic Valve  The AV can not be assessed completely but is opening partially with each  systole. There is mild aortic sclerosis of the non-coronary cusp.     Compared to Previous Study  There is no prior study for  direct comparison.     _____________________________________________________________________________  __  MMode/2D Measurements & Calculations  asc Aorta Diam: 3.0 cm           _____________________________________________________________________________  __           Report approved by: Tonya REILLY 04/12/2020 12:41 PM          Labs:  Recent Labs   Lab 04/13/20  0400 04/13/20  0141 04/12/20  2340 04/12/20  2152   PH 7.37 7.38 7.36 7.36   PCO2 44 42 44 44   PO2 189* 130* 203* 195*   HCO3 25 25 25 25   O2PER 40 40 40 40       Lab Results   Component Value Date    HGB 8.7 (L) 04/13/2020    PHGB 30 (H) 04/13/2020     (L) 04/13/2020    FIBR 358 04/13/2020    INR 1.38 (H) 04/13/2020    PTT 90 (H) 04/13/2020    DD 1.2 (H) 04/13/2020    AXA 0.26 04/13/2020    ANTCH 54 (L) 04/12/2020         Plan is to continue VA ECMO.      Anthony Fenton  4/13/2020 5:32 AM

## 2020-04-13 NOTE — PROGRESS NOTES
ECMO Shift Summary:      ECMO Equipment:    Console serial number: [id number]   Circuit Lot number: Cardiohelp [id number]  Oxygenator Lot number: [id number]         Patient remains on VA ECMO, all equipment is functioning and alarms are appropriately set. RPM's 3900 with flow range 4.64-4.75 L/min. Sweep gas is at 3 LPM and FiO2 90%. Circuit remains free of air, clot and fibrin. Cannulas are secure with no bleeding from site. Extremities are warm. Suctioned ETT for small amount of secretions and clear oral secretions.    Significant Shift Events: none    Vent settings:  Ventilation Mode: CMV/AC  (Continuous Mandatory Ventilation/ Assist Control)  FiO2 (%): 40 %  Rate Set (breaths/minute): 14 breaths/min  Tidal Volume Set (mL): 450 mL  PEEP (cm H2O): 7 cmH2O  Oxygen Concentration (%): 40 %  Resp: 27  .    Heparin is running at 900 u/hr, ACT range 154-162.    Urine output is as charted, blood loss was oozing from Right groin. Product given included none.      Intake/Output Summary (Last 24 hours) at 4/13/2020 1801  Last data filed at 4/13/2020 1800  Gross per 24 hour   Intake 2265.52 ml   Output 1065 ml   Net 1200.52 ml       ECHO:  No results found for this or any previous visit.No results found for this or any previous visit.    CXR:  Recent Results (from the past 24 hour(s))   XR Chest Port 1 View    Narrative    TEMPORARY  4/13/2020 12:55 AM      History: Check endotracheal tube placement and ECLS cannula placement.      COMPARISON: 4/12/2020    FINDINGS:   Endotracheal tube tip projects over the midthoracic trachea, 5.7 cm  from the julia. ECMO cannula in similar position. Intra-aortic  balloon pump tip in similar position. Esophageal temperature probe.  Enteric tube partially visualized, coiling within the stomach.    Stable cardiac silhouette. Slightly improved lung volumes. No focal  airspace opacity. No pleural effusion. No pneumothorax.      Impression    IMPRESSION:   1. Endotracheal tube tip projects over  the midthoracic trachea, 5.7 cm  from the julia. Stable positioning of additional support devices.  2. No focal airspace opacity.    I have personally reviewed the examination and initial interpretation  and I agree with the findings.    KEYONNA MCINTOSH MD   US Carotid Bilateral    Narrative    BILATERAL CAROTID DUPLEX DOPPLER ULTRASOUND 4/13/2020 8:09 AM    CLINICAL HISTORY: s/p ECMO    COMPARISON: None available    REFERRING PROVIDER: KARTHIKEYAN HEATH    TECHNIQUE: Grayscale (B-mode) and duplex and spectral Doppler  ultrasound of the extracranial internal carotid, external carotid,  vertebral artery origins, right brachiocephalic/subclavian and left  subclavian arteries. Velocity measurements obtained with angle  correction at or less than 60 degrees.    FINDINGS: Abnormal waveforms throughout, consistent with ECMO.    RIGHT SIDE:     Plaque Morphology: Mixed echogenicity plaque causing 50-70% diameter  stenosis.       Proximal CCA: 141/10 cm/s     Mid CCA: 128/20 cm/s     Distal CCA: 125/15 cm/s          External CA: 129/0 cm/s       Proximal ICA: 169/30 cm/s     Mid ICA: 184/49 cm/s     Distal ICA: 150/39 cm/s       Vertebral artery: Antegrade: 61/0 cm/s     ICA/CCA ratio: 1.47     LEFT SIDE:     Plaque Morphology: Mixed echogenicity plaque causing 50-70% diameter  stenosis.        Proximal CCA: 151/22 cm/s     Mid CCA: 186/93 cm/s     Distal CCA: 129/12 cm/s          External CA: 116/30 cm/s       Proximal ICA: 193/24 cm/s     Mid ICA: 139/49 cm/s     Distal ICA: 146/49 cm/s       Vertebral artery: Antegrade: 86/0 cm/s    ICA/CCA ratio: 1.50       Impression    IMPRESSION: Abnormal waveforms throughout, consistent with ECMO.    1. RIGHT ICA: 50-69% diameter stenosis by grayscale imaging and  sonographic velocity criteria.    2. LEFT ICA:  50-69% diameter stenosis by grayscale imaging and  sonographic velocity criteria.    Consensus Panel Gray-Scale and Doppler US Criteria for Diagnosis of  ICA Stenosis  (Radiology 2003) additionally modified by Jordon sweeney  al. in Journal of Vascular Surgery 2011, [phone])[phone].       Normal         ICA PSV < 140 cm/sec       Plaque Estimate None       ICA/CCA  PSV Ratio < 2.0       ICA EDV < 40 cm/sec       < 50%          ICA PSV < 140 cm/sec       Plaque Estimate < 50%       ICA/CCA  PSV Ratio < 2.0       ICA EDV < 40 cm/sec       50- 69%       ICA -230 cm/sec       Plaque Estimate > or = 50%       ICA/CCA PSV Ratio 2.0-4.0       ICA EDV  cm/sec         > or = 70%, less than near occlusion       ICA PSV > 230 cm/sec       Plaque Estimate > or = 50%       ICA/CCA Ratio > 4.0       ICA EDV > 100 cm/sec                                            Additional criteria from vascular surgery     > 80%       EDV > 120 cm/sec     JACOB BURR MD   Echocardiogram Limited    Narrative    [id number]  [id number]  [id number]  [id number]^BONITA^SUJIT^JOSE RAMON           [facility],[place]  Echocardiography Laboratory  [address] [place] [id number]     Name: WELLINGTON MAYES  MRN: [id number]  : 1965  Study Date: 2020 10:09 AM  Age: 54 yrs  Gender: Male  Patient Location: [facility]  Reason For Study: Cardiac Arrest  Ordering Physician: SUJIT MESA  Performed By: Floridalma Pulido RDCS     BSA: 2.1 m2  Height: 70 in  Weight: 200 lb  HR: 103  _____________________________________________________________________________  __        Procedure  Limited Portable Echo Adult.  _____________________________________________________________________________  __        Interpretation Summary  VA ECMO at 4.8 LPM.  LV EF 5-10%.  Global right ventricular function is moderately to severely reduced.  Small circumferential pericardial effusion is present without any hemodynamic  significance.  There has been no change.  _____________________________________________________________________________  __        Left Ventricle  VA ECMO at 4.8 LPM.  LV EF  5-10%.     Right Ventricle  Global right ventricular function is moderately to severely reduced.     Pericardium  Small circumferential pericardial effusion is present without any hemodynamic  significance.        Compared to Previous Study  There has been no change.  _____________________________________________________________________________  __                          Report approved by: Tonya Carpenter 04/13/2020 11:12 AM              _____________________________________________________________________________  __      XR Abdomen Port 1 View    Narrative    Exam: XR ABDOMEN PORT 1 VW, 4/13/2020 1:51 PM    Indication: cardiac arrest, on VA ECMO, would like to replace OG    Comparison: Abdominal radiograph 4/11/2020    Findings:   Portable supine AP radiograph the abdomen. Feeding tube tip projects  of the proximal degenerative. Gastric tube sequela the stomach.  Partially visualized ECMO cannulae and intra-aortic balloon pump with  inferior marker at the level of the superior endplate of L3. Relative  paucity of gas in the visualized abdomen. The osseous structures are  unremarkable.      Impression    Impression:   1. Feeding tube tip projects over the proximal jejunum. Gastric tube  tip coiled in the stomach.  2. Nonobstructive bowel gas pattern.    I have personally reviewed the examination and initial interpretation  and I agree with the findings.    RONNI EL MD       Labs:  Recent Labs   Lab 04/13/20  1614 04/13/20  1423 04/13/20  1152 04/13/20  1002   PH 7.42 7.41 7.44 7.39   PCO2 33* 36 33* 40   PO2 98 86 65* 109*   HCO3 22 23 22 25   O2PER 40.0 40 40 40       Lab Results   Component Value Date    HGB 7.9 (L) 04/13/2020    PHGB 30 (H) 04/13/2020     (L) 04/13/2020    FIBR 559 (H) 04/13/2020    INR 1.37 (H) 04/13/2020    PTT 73 (H) 04/13/2020    DD 0.9 (H) 04/13/2020    AXA 0.21 04/13/2020    ANTCH 48 (L) 04/13/2020         Plan is continue to provide support.      Zakiya Gibbons,  RT  4/13/2020 6:01 PM

## 2020-04-13 NOTE — PROGRESS NOTES
Cardiology Critical Care Note - Cardiology  Natalio Damon M.D.  Patient Dino Bundy is a 55 y/o M with PMH of HTN, EtOH use who presented as VF cardiac arrest. Patient went to his girlfriend's house this morning (he biked there) and they decided to sit on the porch. While she was making coffee she heard him calling her and she found him on the floor. She called 911 and started CPR. EMS then arrived and transferred him to [facility]. He was VA cannulated 25F/17F,8F reperfusion cannula  and IABP placed.  Patient is enrolled in the ARREST trial early ECMO group.    Critical Care Diagnoses:  Cardiogenic shock  Acute hypoxic respiratory failure  Pneumonia/ Septic shock  Acute renal failure  Rib fractures  Acute liver failure  Anoxic brain injury      Critical Care management intervention:  -EPI ggt for added pulsatility.  -Digoxin 500 mcg IV x2  by 6 hrs  -Place NJ tube, start feeds on 4/14  - decrease ACT to 160 to counter the groin oozing.  - gave one unit of PRBC and 250 of Albumin for volume expansion    Spent 40 minutes on critical care management on this patient not including ECMO management.      Professor Marisol GALVEZ  Interventional Cariology and Cardiac Critical Care        ECMO Attending Progress Note  4/13/2020    Dino Cutler is a 54 year old male who was cannulated for ECMO  due to refractory VF arrest. Patient had PCI of his LAD and has been doing well .     Interval events: Rewarmed.     Physical Exam:  Temp:  [94.1  F (34.5  C)-98.4  F (36.9  C)] 98.4  F (36.9  C)  Heart Rate:  [] 96  Resp:  [14-33] 23  MAP:  [58 mmHg-78 mmHg] 75 mmHg  Arterial Line BP: ()/(41-64) 107/54  FiO2 (%):  [40 %] 40 %  SpO2:  [96 %-100 %] 99 %    Intake/Output Summary (Last 24 hours) at 4/13/2020 1041  Last data filed at 4/13/2020 1000  Gross per 24 hour   Intake 4271.57 ml   Output 1060 ml   Net 3211.57 ml    Ventilation Mode: CMV/AC  (Continuous Mandatory Ventilation/ Assist Control)  FiO2 (%):  40 %  Rate Set (breaths/minute): 14 breaths/min  Tidal Volume Set (mL): 450 mL  PEEP (cm H2O): 7 cmH2O  Oxygen Concentration (%): 40 %  Resp: 23     General: no acute distress, intubated and sedated  HEENT: PERRLA, conjunctiva clear, without icterus or pallor, oropharyx clear  Cardiac: RRR nl S1S2   Lungs: crackles bilaterally.  Abdomen: soft, nontender, non distended, no hepatosplenomegaly or masses  Extremities: without edema or cyanosis; pulses are dopplerable;   Skin: normal skin appearance without worrisome lesions.   Neurologic:intubated and sedated,     Labs:  Recent Labs   Lab 20  1002 20  0811 20  0559 20  0400   PH 7.39 7.40 7.38 7.37   PCO2 40 41 41 44   PO2 109* 109* 118* 189*   HCO3 25 25 25 25   O2PER 40 40 40 40      Recent Labs   Lab 20  1002 20  0346 20  2152 20  1556   WBC 10.2 9.7 9.8 11.4*   HGB 7.9* 8.7* 8.8* 8.1*     Creatinine   Date Value Ref Range Status   2020 2.46 (H) 0.66 - 1.25 mg/dL Final   2020 2.18 (H) 0.66 - 1.25 mg/dL Final   2020 2.16 (H) 0.66 - 1.25 mg/dL Final   2020 1.83 (H) 0.66 - 1.25 mg/dL Final       Arterial Cannula Maori: 17  Venous Cannula Location: RFV  Blood Flow (Circuit) LPM: 4.67 LPM  Gas Flow  LPM: 4 LPM  Gas FiO2   %: 80 %  ACT  (seconds): 155 seconds  Blood Temp  (degrees C): 36.9 C  Pulse Oximetry  (SpO2%): 100 %  Arterial Pressure  mmH mmHg      ECMO Issues including assessments and plan on DOS 2020:  Neuro: Sedated for mechanical ventilation and ECMO.  NIRS stable  b/l  RASS goal: -4  CV: Cardiogenic shock.  Hemodynamically stable on no drips  Pulm: Flows unchanged at 4.7, Sweep unchanged at 3, Keep vent settings at rest settings as above  FEN/Renal: Electrolytes stable w/ replacement protocols in place, Cr stable, UOP stable  Heme: ACT goal: 160-180, Hemoglobin stable ~8 .  Goals: if O2 sat >85% Hgb >8.  If O2 Sat <85% keep Hgb 10-12.  Mild oozing around the ECMO cannulas.  ID:  Receiving empiric antibiotics  Cannulae: Position is acceptable on exam and the available imaging.  Distal perfusion cannula is in place and patent.     I have personally reviewed the ECMO flows, oxygenation and CO2 clearance, anticoagulation, and cannula position.  I have also personally assessed the patient's systemic response with hemodynamics, oxygenation, ventilation, and bleeding.       I have seen and examined the patient with the CSI team. I agree with the assessment and plan of the note above.I have reviewed pertinent labs.     Natalio Damon MD  Interventional Cardiology  Pager: [phone]    April 13, 2020

## 2020-04-13 NOTE — PROCEDURES
Small Bowel Feeding Tube Placement Assessment  Reason for Feeding Tube Placement: placement of post-pyloric feeding tube for nutrition and medication administration, pt on VA ECMO.  Cortrak Start Time: 11:15   Cortrak End Time: 11:45  Medicine Delivered During Procedure: lubricating jelly  Placement Successful: Yes, presumed post-pyloric. AXR pending.  Procedure Complications: Tube kinking, slow peristalsis  Final Placement Lj at exit of nare: 109 cm  Face to Face time with patient: 45 minutes    Bridle Placement:   Reason for bridle placement: Securement of feeding tube   Medicine delivered during procedure: lubricating jelly  Procedure: Successful  Location of top of clip on FT: @ 111 cm marker   Condition of nose/skin at time of bridle placement: Unremarkable   Face to Face time with patient: 5 minutes.    Blaire Yeboah, MS, RD, LD, [facility]  Neuro ICU  ASCOM [id number]  Pager: [phone]    Shayla Clark RD, LD  ([facility] diet[facility], ips- [phone])

## 2020-04-14 NOTE — PHARMACY-VANCOMYCIN DOSING SERVICE
Pharmacy Vancomycin Note  Date of Service 2020  Patient's  1965   54 year old, male    Indication: Aspiration Pneumonia  Goal Trough Level: 15-20 mg/L  Day of Therapy: 4  Current Vancomycin regimen:  Intermittent dosing    Current estimated CrCl = Estimated Creatinine Clearance: 42.4 mL/min (A) (based on SCr of 2.23 mg/dL (H)).    Creatinine for last 3 days  2020:  1:00 PM Creatinine 1.14 mg/dL;  2:36 PM Creatinine 1.14 mg/dL;  4:25 PM Creatinine 1.18 mg/dL; 10:07 PM Creatinine 1.50 mg/dL  2020:  3:36 AM Creatinine 1.61 mg/dL;  9:58 AM Creatinine 1.83 mg/dL;  3:56 PM Creatinine 2.16 mg/dL;  9:52 PM Creatinine 2.18 mg/dL  2020:  3:46 AM Creatinine 2.46 mg/dL; 10:02 AM Creatinine 2.68 mg/dL;  4:14 PM Creatinine 2.60 mg/dL;  9:35 PM Creatinine 2.42 mg/dL  2020:  3:36 AM Creatinine 2.23 mg/dL    Recent Vancomycin Levels (past 3 days)  2020:  3:36 AM Vancomycin Level 17.3 mg/L    Vancomycin IV Administrations (past 72 hours)                   vancomycin (VANCOCIN) 1,750 mg in sodium chloride 0.9 % 250 mL intermittent infusion (mg) 1,750 mg New Bag 20 0454                Nephrotoxins and other renal medications (From now, onward)    Start     Dose/Rate Route Frequency Ordered Stop    20 1115  vancomycin (VANCOCIN) 1,750 mg in sodium chloride 0.9 % 250 mL intermittent infusion      1,750 mg (central catheter)  over 60 Minutes Intravenous ONCE 20 1055      20 1025  vancomycin place peng - receiving intermittent dosing      1 each Does not apply SEE ADMIN INSTRUCTIONS 20 1026 20 1024             Contrast Orders - past 72 hours (72h ago, onward)    Start     Dose/Rate Route Frequency Ordered Stop    20 1100  perflutren diluted 1mL to 2mL with saline (OPTISON) diluted injection 6 mL      6 mL Intravenous ONCE 20 1055 04/12/20 1100          Interpretation of levels and current regimen:  ough level is  Therapeutic (24 hr  = 17.3  mg/L)    Has serum creatinine changed > 50% in last 72 hours: Yes    Urine output:  good urine output    Renal Function: Improving    Plan:  1.  Will give 1750 mg once now. Will continue with dosing based on levels and re-dose when expected level is <20 mg/L.  2.  Pharmacy will check trough levels as appropriate in 1-3 Days.    3. Serum creatinine levels will be ordered daily for the first week of therapy and at least twice weekly for subsequent weeks.      Tejinder Acuña, PharmD

## 2020-04-14 NOTE — PROGRESS NOTES
[facility]    Patient Name:  Odilia Gusman  MRN:  [id number]    :  1900    Date of Admission:  2020  Date of Service:  2020             Assessment and Plan     # Severe diffuse anoxic brain injury after cardiac arrest     Mr. Bundy is a gentleman who had a refractory cardiac arrest on . He is still on Fentanyl 100 mcg /hr and Versed 2 mg/hr and propofol 30 mcg/kg/min. Pt was rewarmed to 37 degrees . Patient still on sedation that confound the exam.   CT head today with severe anoxic brain injury. EEG suppressed. The prognosis is bad. He will have neurological deficit that is most likely will be severe based on the severity of the CT findings.      Ama Perry MD  PGY 2  [phone]         HPI     Mr. Bundy is a 56 year old gentleman who presented after a cardiac arrest. The arrest was witnessed and bystander CPR was performed. Rhythm was VF and the patient received 11 shocks and 3 rounds of epi. He was apparently on the Giovanni for over 1 hr. LAD disease was found in the cath lab and stent was placed. He is rewarmed today. He is on VA ECMO. Still on midazolam 2, propofol and fentanyl 100.      CT head reveals severe anoxic brain injury. EEG reveals severe suppression.     He has acute liver and kidney injuries, still worsening.     Home Medications:  No medications prior to admission.       Current Medications:  Current Facility-Administered Medications   Medication     albumin human 25 % injection 12.5 g     albumin human 5 % injection 25 g     albumin human 5 % injection 25 g     albuterol (PROAIR HFA/PROVENTIL HFA/VENTOLIN HFA) 108 (90 Base) MCG/ACT inhaler 6 puff     artificial tears ophthalmic ointment     aspirin (ASA) chewable tablet 81 mg     bisacodyl (DULCOLAX) Suppository 10 mg     calcium chloride in  mL intermittent infusion 1 g     calcium chloride injection 1 g     ceFEPIme (MAXIPIME) 2 g vial to attach to   ml bag for ADULTS or 50 ml bag for PEDS     dextrose 10% infusion     dextrose 10% infusion     dextrose 10% infusion     glucose gel 15-30 g    Or     dextrose 50 % injection 25-50 mL    Or     glucagon injection 1 mg     docusate (COLACE) 50 MG/5ML liquid 100 mg     EPINEPHrine (ADRENALIN) 5 mg in sodium chloride 0.9 % 250 mL infusion     fentaNYL (SUBLIMAZE) bolus from infusion pump-ADULT 50 mcg     fentaNYL (SUBLIMAZE) infusion     flumazenil (ROMAZICON) injection 0.2 mg     [START ON 4/16/2020] folic acid (FOLVITE) tablet 1 mg     folic acid injection 1 mg     heparin 2 unit/mL in 0.9% NaCl (for REPERFUSION CATHETER)     heparin 25,000 units in 0.45% NaCl 250 mL ANTICOAGULANT  infusion     insulin 1 unit/mL in saline (NovoLIN, HumuLIN Regular) drip - ADULT IV Infusion     lactated ringers BOLUS 1,000 mL     lidocaine (LMX4) cream     lidocaine (XYLOCAINE) 2 % solution 5 mL     lidocaine 1 % 0.1-1 mL     magnesium hydroxide (MILK OF MAGNESIA) suspension 30 mL     magnesium sulfate 2 g in water intermittent infusion     magnesium sulfate 4 g in 100 mL sterile water (premade)     Medication Considerations - To maintain platelet inhibition after discontinuation of cangrelor (KENGREAL) [see admin instructions]     midazolam (VERSED) drip - ADULT 100 mg/100 mL in NS PRE-MIX     midazolam (VERSED) injection 1-3 mg     multivitamins w/minerals (CERTAVITE) liquid 15 mL     naloxone (NARCAN) injection 0.1-0.4 mg     oxymetazoline (AFRIN) 0.05 % spray 2 spray     [START ON 4/15/2020] pantoprazole (PROTONIX) 2 mg/mL suspension 40 mg     potassium chloride 20 mEq in 50 mL intermittent infusion     propofol (DIPRIVAN) infusion     sodium chloride (PF) 0.9% PF flush 3 mL     sodium chloride (PF) 0.9% PF flush 3 mL     sodium phosphate 10 mmol in D5W intermittent infusion     sodium phosphate 15 mmol in D5W intermittent infusion     sodium phosphate 20 mmol in D5W intermittent infusion     sodium phosphate 25 mmol in D5W  "intermittent infusion     thiamine (B-1) 200 mg in sodium chloride 0.9 % 50 mL intermittent infusion     ticagrelor (BRILINTA) tablet 90 mg     vancomycin place peng - receiving intermittent dosing     [START ON 4/20/2020] vitamin B1 (THIAMINE) tablet 100 mg     [START ON 4/15/2020] vitamin B1 (THIAMINE) tablet 100 mg            Physical Examination      Weight:195 lbs 5.24 oz; Height:5' 10\"  Temp: 97.7  F (36.5  C) Temp src: Esophageal     Heart Rate: 95 Resp: 16 SpO2: 100 % O2 Device: Mechanical Ventilator      General:  Patient lying in bed without any acute distress    HEENT:  EEG leads in place  Cardio:  Regular rate and rhythm   Pulmonary:  Intubated and mechanically ventilated   Extremities:  Extremity edema  Skin:  Warm/dry     Neurologic Examination:  Mental Status: Unresponsive to noxious stimulation.   Language: Nonverbal. Intubated.    Speech: Nonverbal. Intubated.    Cranial Nerves: Pupils right 5 mm and left is 4 mm ,non reactive to light bilaterally. Corneal reflex absent . Oculocephalic reflex absent.  Cough reflex absent.  Sensory/Motor: no response to pain in both UEs and LEs           Labs and Imaging Studies       Recent Labs   Lab Test 04/12/20  0336 04/11/20  2207 04/11/20  1625   * 147* 145*   POTASSIUM 4.0 3.9 4.0   CHLORIDE 113* 113* 112*   CO2 25 26 23   ANIONGAP 8 9 10   * 106*  112* 172*  179*   BUN 25 22 19   CR 1.61* 1.50* 1.18   COLEEN 6.8* 7.0* 7.2*   WBC 6.7 9.3 13.4*   RBC 2.65* 2.61* 3.80*   HGB 8.5* 8.4* 12.1*    210 325       Recent Labs   Lab 04/14/20  1202 04/14/20  1030 04/14/20  0757 04/14/20  0636   PH 7.43 7.51* 7.46* 7.45   PCO2 34* 28* 34* 36   PO2 102 75* 174* 203*   HCO3 22 22 24 25   O2PER 40.0 40.0 40.0 40.0       Recent Labs   Lab 04/14/20  1205 04/14/20  1033 04/14/20  1030 04/14/20  0801 04/14/20  0636 04/14/20  0336 04/14/20  0226 04/13/20  2354 04/13/20  2135  04/13/20  1853 04/13/20  1802 04/13/20  1621   GLC  --   --  119*  121*  --  97 95 " 95 111* 150*  153*   < >  --   --   --    * 121*  --  90  --   --   --   --   --   --  149* 154* 164*    < > = values in this interval not displayed.

## 2020-04-14 NOTE — PROGRESS NOTES
"SPIRITUAL HEALTH SERVICES  SPIRITUAL ASSESSMENT Progress Note (Palliative Focus)  [facility] ([place]) 4E    REFERRAL SOURCE: Staff referral.    Telephone conversation with patient Dino Cutler's son Keaton, who had questions regarding family's ability to visit with Dino. They plan to come this evening (siblings and children) and visit with him to \"say goodbye\" prior to likely transition to comfort measures only.     Family are mutually supportive and supportive of Dino, travelling from [place] by car to be with him.    ADDENDUM: Visited with family in lobby and briefly in Dino's room. Children, siblings, and mother are grieving together, sharing stories of his life and character. Son Gumaro plans to be at bedside during transition to comfort measures only. No further spiritual/emotional support needs identified at this time. Family expressed appreciation of support and is oriented to how to request a  as needed.    Plan: I will follow for spiritual support while Palliative Care is consulted.      Martina Rubalcava  Palliative   Pager [phone]  [facility] Inpatient Team Consult pager [phone] (M-F 8-4:30)  After-hours Answering Service [phone]    "

## 2020-04-14 NOTE — PROGRESS NOTES
Cardiology Critical Care Note - Cardiology  Natalio Damon M.D.  Patient Dino Bundy is a 57 y/o M with PMH of HTN, EtOH use who presented as VF cardiac arrest. Patient went to his girlfriend's house this morning (he biked there) and they decided to sit on the porch. While she was making coffee she heard him calling her and she found him on the floor. She called 911 and started CPR. EMS then arrived and transferred him to [facility]. He was VA cannulated 25F/17F,8F reperfusion cannula  and IABP placed.  Patient is enrolled in the ARREST trial early ECMO group.    Critical Care Diagnoses:  Cardiogenic shock  Acute renal failure  Rib fractures  Acute liver failure  Anoxic brain injury      Critical Care management intervention:  -EPI ggt for 0.01.  - Turn down today  -Place NJ tube, start feeds on 4/14  - head CT today .    Spent30 minutes on critical care management on this patient not including ECMO management.      Professor Marisol GALVEZ  Interventional Cariology and Cardiac Critical Care        ECMO Attending Progress Note  4/14/2020    Dino Cutler is a 54 year old male who was cannulated for ECMO  due to refractory VF arrest. Patient had PCI of his LAD and has been doing well .     Interval events: Rewarmed heodynamically stable EEG with absent recovery. No reflexes    Physical Exam:  Temp:  [97.7  F (36.5  C)-98.4  F (36.9  C)] 97.7  F (36.5  C)  Heart Rate:  [] 88  Resp:  [15-28] 15  MAP:  [68 mmHg-145 mmHg] 83 mmHg  Arterial Line BP: ()/(45-64) 123/58  FiO2 (%):  [40 %] 40 %  SpO2:  [94 %-100 %] 100 %      Intake/Output Summary (Last 24 hours) at 4/14/2020 1030  Last data filed at 4/14/2020 0900  Gross per 24 hour   Intake 1740.92 ml   Output 1418 ml   Net 322.92 ml        Ventilation Mode: CMV/AC  (Continuous Mandatory Ventilation/ Assist Control)  FiO2 (%): 40 %  Rate Set (breaths/minute): 14 breaths/min  Tidal Volume Set (mL): 450 mL  PEEP (cm H2O): 7 cmH2O  Oxygen Concentration (%):  40 %  Resp: 15     General: no acute distress, intubated and sedated  HEENT: PERRLA, conjunctiva clear, without icterus or pallor, oropharyx clear  Cardiac: RRR nl S1S2   Lungs: crackles bilaterally.  Abdomen: soft, nontender, non distended, no hepatosplenomegaly or masses  Extremities: without edema or cyanosis; pulses are dopplerable;   Skin: normal skin appearance without worrisome lesions.   Neurologic:intubated and sedated,     Labs:  Recent Labs   Lab 20  0757 20  0636 20  0336 20  0226   PH 7.46* 7.45 7.43 7.41   PCO2 34* 36 37 39   PO2 174* 203* 148* 176*   HCO3 24 25 25 25   O2PER 40.0 40.0 40.0 40.0      Recent Labs   Lab 20  0336 20  2135 20  1614 20  1002   WBC 14.2* 14.2* 15.3* 10.2   HGB 7.3* 7.6* 7.9* 7.9*     Creatinine   Date Value Ref Range Status   2020 2.23 (H) 0.66 - 1.25 mg/dL Final   2020 2.42 (H) 0.66 - 1.25 mg/dL Final   2020 2.60 (H) 0.66 - 1.25 mg/dL Final   2020 2.68 (H) 0.66 - 1.25 mg/dL Final       Arterial Cannula Greek: 17  Venous Cannula Location: RFV  Blood Flow (Circuit) LPM: 4.67 LPM  Gas Flow  LPM: 4 LPM  Gas FiO2   %: 80 %  ACT  (seconds): 155 seconds  Blood Temp  (degrees C): 36.9 C  Pulse Oximetry  (SpO2%): 100 %  Arterial Pressure  mmH mmHg      ECMO Issues including assessments and plan on DOS 2020:  Neuro: Sedated for mechanical ventilation and ECMO.  NIRS stable  b/l  RASS goal: -4  CV: Cardiogenic shock.  Hemodynamically stable on no drips  Pulm: Flows unchanged at 4.7, Sweep unchanged at 3, Keep vent settings at rest settings as above  FEN/Renal: Electrolytes stable w/ replacement protocols in place, Cr stable, UOP stable  Heme: ACT goal: 160-180, Hemoglobin stable ~8 .  Goals: if O2 sat >85% Hgb >8.  If O2 Sat <85% keep Hgb 10-12.  Mild oozing around the ECMO cannulas.  ID: Receiving empiric antibiotics  Cannulae: Position is acceptable on exam and the available imaging.  Distal  perfusion cannula is in place and patent.     I have personally reviewed the ECMO flows, oxygenation and CO2 clearance, anticoagulation, and cannula position.  I have also personally assessed the patient's systemic response with hemodynamics, oxygenation, ventilation, and bleeding.       I have seen and examined the patient with the CSI team. I agree with the assessment and plan of the note above.I have reviewed pertinent labs.     Natalio Damon MD  Interventional Cardiology  Pager: [phone]    April 14, 2020

## 2020-04-14 NOTE — PROGRESS NOTES
ECMO Turndown Note:    Time             Flow          RPM        HR         MAP           SPO2    0737             3.9LPM      3500        88          68               100%  0739             2.5LPM      2650        95          71                97%  0744             1.56LPM    2100        98          62                88%    Small amount of pulsatility noted when IABP paused and ECMO flows at 1.56LPM.  Patient tolerated turndown but did drop SpO2 as low as 86% while flowing at 1.56LPM and IABP in standby. Vent settings FiO2 40%, peep +7. ECMO settings Sweep 2,  FiO2 90%.    Michelle Hobson, RRT

## 2020-04-14 NOTE — PROCEDURES
EEG #-2 (Day 2 of Video-EEG Monitoring)    DATE OF RECORDIN2020    DURATION OF RECORDIN hours, 49 minutes     CLINICAL SUMMARY:  This diagnostic video-EEG monitoring procedure was performed in evaluation of encephalopathy in Wellington Bundy, who was reported to have sustained cardiac arrest.  He was intubated and mechanically ventilated throughout the period of monitoring, with continuous infusions of propofol, midazolam and fentanyl.      TECHNICAL SUMMARY:  This continuous EEG monitoring procedure was performed with 23 scalp electrodes in 10-20 system placements, and additional scalp, precordial and other surface electrodes used for electrical referencing and artifact detection.  Video monitoring was utilized and periodically reviewed by EEG technologists and the physician for electroclinical correlation.     EEG ACTIVITIES DURING COMA:  During ongoing sedated coma, there was no evidence of wake-sleep cycling.    There was continuous generalized suppression of all electrocerebral activities, with no activities exceeding 5 microvolts in amplitude.    There was no evidence of reactivity to sensory stimulation.      No interictal epileptiform abnormalities and no electrographic seizures were recorded.      SUMMARY OF DAY 2 OF VIDEO-EEG MONITORING:    The EEG recording during sedated coma was abnormal due to continuous, severe, generalized suppression of all electrocerebral activities.    No interictal epileptiform abnormalities and no electrographic seizures were recorded.    These findings indicate profound electrocerebral dysfunction.  Clinical correlation is recommended.   Frank Jarrett M.D., Professor of Neurology       D: 2020   T: 2020   MT: SCOTT      Name:     WELLINGTON BUNDY   MRN:      [id number]        Account:        [id number]   :      1965           Procedure Date: 2020      Document: [id number]

## 2020-04-14 NOTE — PROCEDURES
Procedure Date: 04/11/2020      PATIENT:  Dino Cutler      EEG #:  -1.      This is day 1 of 24-hour video EEG.      DATE OF RECORDING/SERVICE DATE:  04/11/2020.      SOURCE FILE DURATION:  5 hours, 1 minute, 23 seconds      CLINICAL SUMMARY:  Dino Cutler is a 56-year-old male with past medical history of hypertension and ethanol abuse who presented as VF cardiac arrest, was resuscitated and transferred to [facility].  EEG was performed to evaluate for seizures.     MEDICATIONS:  Fentanyl drip 100 mcg per hour started at 14:49, Versed drip 4-8 mg per hour started at 15:08, propofol drip 20-30 mcg/kg per minute started at 14:10.     TECHNICAL SUMMARY: This continuous video- EEG monitoring procedure was performed with 23 scalp electrodes in 10-20 electrode system placement, and additional scalp, precordial and other surface electrodes used for electrical referencing and artifact detection.  Video monitoring was utilized and periodically reviewed by EEG technologists and the physician for electroclinical correlations.     INTERICTAL ACTIVITY:  No posterior dominant rhythm or sleep-wake cycling is appreciated.  Significant myogenic artifact is present throughout the recording.  There is significant suppression of the background with long stretches of the recording without any electrocerebral activities.  There were rare low amplitude activities in the delta range up to 5 microvolts.  No epileptiform discharges were present.  The patient was stimulated at approximately 19:04 per ICU protocol.  There was no clinical response or EEG reactivity.      CLINICAL/ICTAL EVENTS:  No electrographic or clinical seizures were recorded.      IMPRESSION:  This is a markedly abnormal video EEG due to the presence of significant suppression of the background.  Sedative medications could in part contribute to some of these findings; however, the degree of suppression is beyond the sedative medications.           EUNICE  MD JANET             D: 2020   T: 2020   MT: ELIZABETH      Name:     VIPIN JUAREZ   MRN:      [id number]        Account:        [id number]   :      1900           Procedure Date: 2020      Document: [id number]

## 2020-04-14 NOTE — PROGRESS NOTES
ECMO Shift Summary:      ECMO Equipment:  Console serial number:  Circuit Lot number:  (see perfusion Prime Order Sheet)  Oxygenator Lot number:        Patient remains on VA ECMO, all equipment is functioning and alarms are appropriately set. RPM's 7865-8989  with flow range 3.3-3.94 L/min. Sweep gas is at 1 LPM and FiO2 100%. Circuit remains free of air, clot and fibrin. Cannulas are secure with no bleeding from site. Extremities are cool to the touch. Suctioned ETT for moderate amount of tan secretions.    Significant Shift Events: Patient went to CT for a head scan. Probable anoxic brain injury. See note for further details. Sweep decreased to 1LPM from 2LPM. Circuit FiO2 increased to 100% from 70% due to SvO2 of less than 40 during hypertension episode. Patient had hypertension episode of Maps 170's. Heart rate dropped from 90's to 60's and SvO2 dropped from mid to upper 50's to less than 40. Epi was turned off, ECMO flow dropped to 3.3LPM from 3.8LPM and sedation was increased. Patient settled out with Maps of 70's and has remained stable since.     Vent settings:  Ventilation Mode: CMV/AC  (Continuous Mandatory Ventilation/ Assist Control)  FiO2 (%): 40 %  Rate Set (breaths/minute): 14 breaths/min  Tidal Volume Set (mL): 450 mL  PEEP (cm H2O): 7 cmH2O  Oxygen Concentration (%): 40 %  Resp: 14  .    Heparin is running at 900 u/hr, ACT range 154-159.    Urine output is as charted by RN, blood loss was minimal. No product given.      Intake/Output Summary (Last 24 hours) at 4/14/2020 1406  Last data filed at 4/14/2020 1400  Gross per 24 hour   Intake 1983.72 ml   Output 1411 ml   Net 572.72 ml       ECHO:  No results found for this or any previous visit.No results found for this or any previous visit.    CXR:  Recent Results (from the past 24 hour(s))   XR Chest Port 1 View    Narrative    TEMPORARY  4/14/2020 1:06 AM      History: Check endotracheal tube placement an ACLS cannula placement.     COMPARISON:  2020    FINDINGS:   Stable positioning of endotracheal tube, visualized enteric tubes,  esophageal temperature probe, ECMO cannula and intra-aortic balloon  pump. No focal airspace opacity. No pleural effusion. No pneumothorax.      Impression    IMPRESSION:   1. Stable positioning of support devices.  2. No focal airspace opacity.    I have personally reviewed the examination and initial interpretation  and I agree with the findings.    KEYONNA MCINTOSH MD   Echocardiogram Limited    Narrative    [id number]  [id number]  [id number]  [id number]^JASON^STEFANIA^DANE           [facility]  Echocardiography Laboratory  [address] [place] [id number]     Name: WELLINGTON MAYES  MRN: [id number]  : 1965  Study Date: 2020 07:26 AM  Age: 54 yrs  Gender: Male  Patient Location: [facility]  Reason For Study: Heart Failure - Left  Ordering Physician: STEFANIA GOMEZ  Performed By: EDWARD Jacobs     BSA: 2.1 m2  Height: 70 in  Weight: 195 lb  HR: 92  BP: 123/60 mmHg  _____________________________________________________________________________  __        Procedure  Limited Portable Echo Adult. Technically difficult study.  _____________________________________________________________________________  __        Interpretation Summary  Limited VA ECMO turndown study with IABP support.     Baseline (3.8 LPM): Normal LV size with severely reduced global LV function,  LVEF=5-10%.  The right ventricle is underfilled with moderately reduced global systolic  function.  The ventricular septum is midline.  The aortic valve cannot be assessed.  No pericardial effusion.     There is minimal mprovement in left ventricular function with decrease in flow  rate to 1.5LPM.  _____________________________________________________________________________  __     _____________________________________________________________________________  __     MMode/2D Measurements &  Calculations  IVSd: 1.2 cm  LVIDd: 3.6 cm  LVIDs: 3.5 cm  LVPWd: 1.5 cm  FS: 3.8 %  LV mass(C)d: 171.9 grams  LV mass(C)dI: 83.2 grams/m2  RWT: 0.82           _____________________________________________________________________________  __           Report approved by: Tonya Carranza 04/14/2020 09:03 AM      CT Head w/o Contrast    Narrative    CT HEAD W/O CONTRAST 4/14/2020 10:04 AM    Provided History: follow-up CT head; on ECMO    Comparison: Head CT 4/11/2020.    Technique: Using multidetector thin collimation helical acquisition  technique, axial, coronal and sagittal CT images from the skull base  to the vertex were obtained without intravenous contrast.     Findings:    Diffuse cerebral edema with slitlike ventricles, diffuse sulcal  effacement, and diffuse parenchymal hypodensity. Diffusely diminished  gray-white differentiation of the cerebral hemispheres in scattered  areas of abnormal cerebral and cerebellar hypoattenuation, most  pronounced in the bilateral basal ganglia and thalami. No definite  intracranial hemorrhage. Basal cisterns are effaced.    Layering fluid throughout the paranasal sinuses, nonspecific in the  setting of intubation. Mastoid air cells are clear. Partially  visualized nasoenteric tube.      Impression    Impression: Subacute diffuse anoxic brain injury.    Findings were discussed with Dr. Garg by Dr. Phillip via telephone at  10:15 AM.     I have personally reviewed the examination and initial interpretation  and I agree with the findings.    LINDA VERGARA MD       Labs:  Recent Labs   Lab 04/14/20  1202 04/14/20  1030 04/14/20  0757 04/14/20  0636   PH 7.43 7.51* 7.46* 7.45   PCO2 34* 28* 34* 36   PO2 102 75* 174* 203*   HCO3 22 22 24 25   O2PER 40.0 40.0 40.0 40.0       Lab Results   Component Value Date    HGB 8.0 (L) 04/14/2020    PHGB <30 04/14/2020    PLT 84 (L) 04/14/2020    FIBR 743 (H) 04/14/2020    INR 1.15 (H) 04/14/2020    PTT 60 (H) 04/14/2020    DD 1.1 (H)  04/14/2020    AXA 0.17 04/14/2020    ANTCH 52 (L) 04/14/2020         Plan is to remain on VA ECMO at this time.       Michelle Hobson, RT  4/14/2020 2:06 PM

## 2020-04-14 NOTE — PLAN OF CARE
D/I    Neuro:  Bilateral pupils 3+ and brisk but only very slightly reactive.  No extremity movement.  Fentanyl Gtt @ 75 mcg/hr.  Versed Gtt kept @ 2 mg/hr due to hx ETOH and possibility of withdrawal. Afebrile.    Cardiac:  SR/ST 90's to 100's. IABP MAP's upper 90's lower 100's. IABP 1:1 with 100% augmentation.  Epinephrine Gtt 0.01 to 0.03 ( Currently @ 0.01 ) mcg/kg/min weaned down to try to keep IABP MAP < 100.  ECMO Flows 3.8 - 4.6 ( currently 3.8 to 4 ) lpm.  Speed= 3500 - 3900 ( Currently @ 3500 ) rpm. Sweep= 3. FIO2= 90%. Heparin @ 900 units/hr all shift with goal for ACT of 160 -180.  Slow ooze blood from right groin femoral ECMO sites.  One unit cells given this shift.    Pulm:  Vent VC-AC mode.  Vt= 450.  RR=14.  PEEP= 7. FIO2= 40%. Coarse lung sounds with diminished bases.  Thick taryn/tan ETT secretions suctioned.    GI:  Hypoactive bowel sounds. Last BM on arrival to unit 4/11/2010.  OG output  thin bile brown to very dark maroon color @ times. OG output was 120 ml this 12 hour shift.    :  Marin output averaging 50 ml/hr cloudy natalie urine but weaning down to 40 ml/hr the last 3 hours of the shift.    Endocrine: Insulin Gtt 2 to 4 ( currently @ 2 ) units/hr.    Skin: Very slow ooze bleed from left  femoral Thermogard triple lumen catheter entrance site in addition to slightly faster right femoral ECMO catheter skin entrance sites.    A/P:  Nursing anticipates start of tube feeding today.  ECMO turn down also anticipated today or tomorrow. Pt might benefit from starting scheduled bowel meds.

## 2020-04-14 NOTE — PHARMACY-CONSULT NOTE
Pharmacy Tube Feeding Consult    Medication reviewed for administration by feeding tube and for potential food/drug interactions.    Recommendation: No changes are needed at this time.     Pharmacy will continue to follow as new medications are ordered.    Tejinder Acuña, DennisD

## 2020-04-14 NOTE — CONSULTS
[facility]  Palliative Care Consultation Note    Patient: Dino Cutler  Date of Admission:  4/11/2020    Requesting Clinician / Team: CSI  Reason for consult: Goals of care  Patient and family support    Recommendations:    Pt seen and examined as part of ECMO order set. CoVid negative.     Attended bedside rounds with primary team. Pt son Gumaro on phone call in room and spoke  with team about increasingly poor prognosis based on Head CT today. Neurocritical care will  be assessing this afternoon.     PC will continue to follow for support of the family. Spoke with daughter Annalee this afternoon.   Discussed the procedures/ plans for withdrawal of mechanical support after her family is able  to gather in the complex CoVid 19 restriction policy.    After family arrives, will be transitioned to full comfort measures based on ICU and  Neuro-Critical Care teams prognosis.    These recommendations have been discussed with primary team.      Thank you for the opportunity to participate in the care of this patient and family. Our team: will continue to follow.     During regular M-F work hours -- if you are not sure who specifically to contact -- please contact us by sending a text page to our team consult pager at [phone].    After regular work hours and on weekends/holidays, you can call our answering service at [phone]. Also, who's on call for us is available in [facility] [employer].       Sean BUCK NP ACHPN  Nurse Practitioner- Lead Advanced Practice Provider  [facility] Palliative Medicine Consult Service   [phone]    TT spent: 40 minutes of which 30 minutes were spent in direct face to face contact with patient/family. Greater than 50% of time spent counseling and/or coordinating care.     Assessments:  Dino Cutler is a 54 year old male with PMH of HTN (untreated), limited medical care & hx of EtOH use who presented to ED in VF cardiac  arrest. Patient rode a bicycle to his girlfriend's house the morning of 4/11, a short while later she found him on the floor after he called for help, activated EMS and started CPR. EMS delivered multiple shocks, TAINA and meds., transferred him to [facility] where he was VA cannulated, IABP placed and enrolled in the ARREST trial early ECMO group.  Today, the patient was  seen PC consultation related to ECMO standing orders for underlying post cardiac arrest support.     Prognosis, Goals, & Planning:      Functional Status just prior to hospitalization: 0 (Fully active, able to carry on all activities without restriction)      Prognosis, Goals, and/or Advance Care Planning were addressed today: Yes        Summary/Comments: likely unable to recover from present poor findings with anoxic brain injury.   Family updated.     Patient's decision making preferences: unable to assess          Patient has decision-making capacity today for complex decisions: No            I have concerns about the patient/family's health literacy today: No           Patient has a completed Health Care Directive: No.       Code status: full code    Coping, Meaning, & Spirituality:   Mood, coping, and/or meaning in the context of serious illness were addressed today: Yes  Summary/Comments: was holiday only [employer] attendee but family believes he would want  support.     Social:   Lives alone. Has told adult children in the past (not written down) that he would not be want to be resuscitated. He enjoys time with his family and old time country music ([employer], [employer])     History of Present Illness:  History gathered today from: medical chart, medical team members  Remains on full ECMO support. EEG findings with diffuse frontal encephalopathy.     Key Palliative Symptom Data:  We are not helping to manage these symptoms currently in this patient.    Patient is on opioids: bowels not assessed today.    ROS:  Comprehensive ROS is unable to be  obtained secondary to critically ill and intubated status     Past Medical History:  No past medical history on file.     Past Surgical History:  Past Surgical History:   Procedure Laterality Date     CV CORONARY ANGIOGRAM N/A 4/11/2020    Procedure: Coronary Angiogram;  Surgeon: Jordon Riggs MD;  Location: [facility] CARDIAC CATH LAB     CV EXTRACORPERAL MEMBRANE OXYGENATION N/A 4/11/2020    Procedure: Extracorporeal Membrance Oxygenation;  Surgeon: Jordon Riggs MD;  Location: [facility] CARDIAC CATH LAB     CV INTRA-AORTIC BALLOON PUMP INSERTION N/A 4/11/2020    Procedure: Intra-Aortic Balloon Pump Insertion;  Surgeon: Jordon Riggs MD;  Location:  [facility] CARD[facility] CATH LAB         Family History:  Family history reviewed per available chart notes     Allergies:  Not on File     Medications:  I have reviewed this patient's medication profile and medications from this hospitalization.     Physical Exam:  Vital Signs: Temp: 97.7  F (36.5  C) Temp src: Esophageal     Heart Rate: 88 Resp: 15 SpO2: 100 % O2 Device: Mechanical Ventilator    Weight: 195 lbs 5.24 oz  General: no acute distress, intubated and sedated. Minimal reflex.   HEENT: EOMI, conjunctiva clear. Orally intubated. Oropharyx clear  Cardiac: RRR nl S1S2. Sinus rhythm.   Lungs: mechanical breath sounds and crackles bilaterally.  Abdomen: soft, non distended, no hepatosplenomegaly or masses  Extremities: without edema or cyanosis; pulses are dopplerable;   Skin: normal skin appearance without worrisome lesions.   Neurologic:intubated and sedated     Data reviewed:  Head CT 4/14/20 Impression: Subacute diffuse anoxic brain injury.    ROUTINE LABS (Last four results)  CMP  Recent Labs   Lab 04/14/20  0636 04/14/20  0336 04/14/20  0226 04/13/20  2354 04/13/20  2135  04/13/20  1614 04/13/20  1002  04/13/20  0346  04/12/20  0336   NA  --  150*  --   --  149*  --  146* 147*  --  149*   < > 146*   POTASSIUM  --  4.2  --   --  4.5  --  4.4 4.5  --  4.7   < >  4.0   CHLORIDE  --  120*  --   --  118*  --  117* 115*  --  116*   < > 113*   CO2  --  25  --   --  25  --  23 26  --  25   < > 25   ANIONGAP  --  4  --   --  6  --  6 6  --  8   < > 8   GLC 97 95 95 111* 150*  153*   < > 174*  177* 121*  124*   < > 112*   < > 122*   BUN  --  41*  --   --  44*  --  43* 42*  --  36*   < > 25   CR  --  2.23*  --   --  2.42*  --  2.60* 2.68*  --  2.46*   < > 1.61*   GFRESTIMATED  --  32*  --   --  29*  --  27* 26*  --  18*   < > 30*   GFRESTBLACK  --  37*  --   --  34*  --  31* 30*  --  21*   < > 35*   COLEEN  --  8.0*  --   --  7.8*  --  7.8* 7.3*  --  7.7*   < > 6.8*   MAG  --  2.6*  --   --  2.3  --  2.2 2.2  --  2.3   < > 1.9   PHOS  --  4.4  --   --   --   --   --   --   --  6.6*  --  4.6*   PROTTOTAL  --  5.2*  --   --  5.0*  --  4.9* 4.8*  --  4.8*   < > 5.0*   ALBUMIN  --  2.5*  --   --  2.6*  --  2.6* 2.8*  --  3.1*   < > 3.4   BILITOTAL  --  0.7  --   --  0.7  --  0.8 1.0  --  1.2   < > 0.9   ALKPHOS  --  35*  --   --  34*  --  32* 27*  --  26*   < > 26*   AST  --  724*  --   --  795*  --  905* 996*  --  1,162*   < > 1,510*   ALT  --  148*  --   --  155*  --  162* 167*  --  175*   < > 215*    < > = values in this interval not displayed.     CBC  Recent Labs   Lab 04/14/20  0336 04/13/20 2135 04/13/20  1614 04/13/20  1002   WBC 14.2* 14.2* 15.3* 10.2   RBC 2.32* 2.37* 2.46* 2.54*   HGB 7.3* 7.6* 7.9* 7.9*   HCT 22.9* 22.8* 23.5* 24.2*   MCV 99 96 96 95   MCH 31.5 32.1 32.1 31.1   MCHC 31.9 33.3 33.6 32.6   RDW 15.5* 15.4* 15.4* 15.4*   PLT 94* 96* 110* 100*     INR  Recent Labs   Lab 04/14/20  0336 04/13/20 2135 04/13/20  1614 04/13/20  1002   INR 1.23* 1.29* 1.37* 1.45*     Arterial Blood Gas  Recent Labs   Lab 04/14/20  0757 04/14/20  0636 04/14/20  0336 04/14/20  0226   PH 7.46* 7.45 7.43 7.41   PCO2 34* 36 37 39   PO2 174* 203* 148* 176*   HCO3 24 25 25 25   O2PER 40.0 40.0 40.0 40.0

## 2020-04-14 NOTE — PROCEDURES
EEG #-3 (Day 3 of Video-EEG Monitoring)    DATE OF RECORDIN2020    DURATION OF RECORDIN hours, 46 minutes.    CLINICAL SUMMARY:  This video-EEG monitoring procedure was performed in diagnostic evaluation of encephalopathy in Wellington Bundy, who was reported to have sustained cardiac arrest.  He was intubated and mechanically ventilated throughout the period of monitoring, with continuous infusions of midazolam and fentanyl.      TECHNICAL SUMMARY:  This continuous EEG monitoring procedure was performed with 23 scalp electrodes in 10-20 system placements, and additional scalp, precordial and other surface electrodes used for electrical referencing and artifact detection.  Video monitoring was utilized and periodically reviewed by EEG technologists and the physician for electroclinical correlation.     EEG ACTIVITIES DURING COMA:  During ongoing sedated coma, there was no evidence of wake-sleep cycling.    There was continuous generalized suppression of all electrocerebral activities, with no activities exceeding 5 microvolts in amplitude.    There was no evidence of reactivity to sensory stimulation.      No interictal epileptiform abnormalities and no electrographic seizures were recorded.      SUMMARY OF DAY 3 OF VIDEO-EEG MONITORING:    The EEG recording during sedated coma was abnormal due to continuous, severe, generalized suppression of all electrocerebral activities.    No interictal epileptiform abnormalities and no electrographic seizures were recorded.    These findings indicate profound electrocerebral dysfunction.  Clinical correlation is recommended.   Frank Jarrett M.D., Professor of Neurology       D: 2020   T: 2020   MT: ZACH      Name:     WELLINGTON MAYES   MRN:      [id number]        Account:        [id number]   :      1965           Procedure Date: 2020      Document: [id number]

## 2020-04-14 NOTE — PLAN OF CARE
Major Shift Events:  Initiated propofol infusion, CT completed, family notified of current status, [facility] contacted.    Plan: Continue current support as of this time with potential of care withdrawal around 2100.     For vital signs and complete assessments, please see documentation flowsheets.

## 2020-04-14 NOTE — PROGRESS NOTES
ECMO Shift Summary: (1845 to 0715)    ECMO Equipment:     Console serial number: [id number]   Circuit Lot number: Cardiohelp [id number]  Oxygenator Lot number: [id number]    Patient remains on VA ECMO, all equipment is functioning and alarms are appropriately set. RPMs 3500 with flow range 3.96 - 4.04 L/min. Sweep gas is at 3 LPM and FiO2 90%. Circuit remains free of air, clot and fibrin. Oxygenator has tiny amount of clot at the outflow cannula. Cannulas are secure with persistent oozing from site. Extremities are warm.     Significant Shift Events:   Persistent bleeding from ECMO site throughout the shift. Sandbag utilized, dressing changed with large amount of clot removed, quick clot applied, new dressing over top. Less bleeding appreciated following aforementioned interventions.     Heparin is running at 900 u/hr, ACT range 155 - 159.    Urine output is 50 mL/hr, blood loss was not measured, but consistent losses from cannula site. Product/volume given included PRBC x1.      Intake/Output Summary (Last 24 hours) at 4/14/2020 0609  Last data filed at 4/14/2020 0600  Gross per 24 hour   Intake 1801.38 ml   Output 1480 ml   Net 321.38 ml       Labs:  Recent Labs   Lab 04/14/20  0336 04/14/20  0226 04/13/20  2354 04/13/20  2135   PH 7.43 7.41 7.37 7.46*   PCO2 37 39 44 32*   PO2 148* 176* 194* 153*   HCO3 25 25 26 22   O2PER 40.0 40.0 40.0 40.0       Lab Results   Component Value Date    HGB 7.3 (L) 04/14/2020    PHGB <30 04/14/2020    PLT 94 (L) 04/14/2020    FIBR 743 (H) 04/14/2020    INR 1.23 (H) 04/14/2020    PTT 67 (H) 04/14/2020    DD 1.1 (H) 04/14/2020    AXA 0.21 04/14/2020    ANTCH 48 (L) 04/13/2020       Vent settings:  Ventilation Mode: CMV/AC  (Continuous Mandatory Ventilation/ Assist Control)  FiO2 (%): 40 %  Rate Set (breaths/minute): 14 breaths/min  Tidal Volume Set (mL): 450 mL  PEEP (cm H2O): 7 cmH2O  Oxygen Concentration (%): 40 %  Resp: 16  .    ECHO:  No results found for this or any previous visit.No  results found for this or any previous visit.    CXR:  Recent Results (from the past 24 hour(s))   US Carotid Bilateral    Narrative    BILATERAL CAROTID DUPLEX DOPPLER ULTRASOUND 4/13/2020 8:09 AM    CLINICAL HISTORY: s/p ECMO    COMPARISON: None available    REFERRING PROVIDER: KARTHIKEYAN HEATH    TECHNIQUE: Grayscale (B-mode) and duplex and spectral Doppler  ultrasound of the extracranial internal carotid, external carotid,  vertebral artery origins, right brachiocephalic/subclavian and left  subclavian arteries. Velocity measurements obtained with angle  correction at or less than 60 degrees.    FINDINGS: Abnormal waveforms throughout, consistent with ECMO.    RIGHT SIDE:     Plaque Morphology: Mixed echogenicity plaque causing 50-70% diameter  stenosis.       Proximal CCA: 141/10 cm/s     Mid CCA: 128/20 cm/s     Distal CCA: 125/15 cm/s          External CA: 129/0 cm/s       Proximal ICA: 169/30 cm/s     Mid ICA: 184/49 cm/s     Distal ICA: 150/39 cm/s       Vertebral artery: Antegrade: 61/0 cm/s     ICA/CCA ratio: 1.47     LEFT SIDE:     Plaque Morphology: Mixed echogenicity plaque causing 50-70% diameter  stenosis.        Proximal CCA: 151/22 cm/s     Mid CCA: 186/93 cm/s     Distal CCA: 129/12 cm/s          External CA: 116/30 cm/s       Proximal ICA: 193/24 cm/s     Mid ICA: 139/49 cm/s     Distal ICA: 146/49 cm/s       Vertebral artery: Antegrade: 86/0 cm/s    ICA/CCA ratio: 1.50       Impression    IMPRESSION: Abnormal waveforms throughout, consistent with ECMO.    1. RIGHT ICA: 50-69% diameter stenosis by grayscale imaging and  sonographic velocity criteria.    2. LEFT ICA:  50-69% diameter stenosis by grayscale imaging and  sonographic velocity criteria.    Consensus Panel Gray-Scale and Doppler US Criteria for Diagnosis of  ICA Stenosis (Radiology 11/2003) additionally modified by Jordon et  al. in Journal of Vascular Surgery 1/2011, [phone].       Normal         ICA PSV < 140 cm/sec        Plaque Estimate None       ICA/CCA  PSV Ratio < 2.0       ICA EDV < 40 cm/sec       < 50%          ICA PSV < 140 cm/sec       Plaque Estimate < 50%       ICA/CCA  PSV Ratio < 2.0       ICA EDV < 40 cm/sec       50- 69%       ICA -230 cm/sec       Plaque Estimate > or = 50%       ICA/CCA PSV Ratio 2.0-4.0       ICA EDV  cm/sec         > or = 70%, less than near occlusion       ICA PSV > 230 cm/sec       Plaque Estimate > or = 50%       ICA/CCA Ratio > 4.0       ICA EDV > 100 cm/sec                                            Additional criteria from vascular surgery     > 80%       EDV > 120 cm/sec     JACOB BURR MD   Echocardiogram Limited    Narrative    [id number]  [id number]  [id number]  [id number]^BONITA^SUJIT^JOSE RAMON           [facility],[place]  Echocardiography Laboratory  [address] [place] [id number]     Name: WELLINGTON MAYES  MRN: [id number]  : 1965  Study Date: 2020 10:09 AM  Age: 54 yrs  Gender: Male  Patient Location: [facility]  Reason For Study: Cardiac Arrest  Ordering Physician: SUJIT MESA  Performed By: Floridalma Pulido RDCS     BSA: 2.1 m2  Height: 70 in  Weight: 200 lb  HR: 103  _____________________________________________________________________________  __        Procedure  Limited Portable Echo Adult.  _____________________________________________________________________________  __        Interpretation Summary  VA ECMO at 4.8 LPM.  LV EF 5-10%.  Global right ventricular function is moderately to severely reduced.  Small circumferential pericardial effusion is present without any hemodynamic  significance.  There has been no change.  _____________________________________________________________________________  __        Left Ventricle  VA ECMO at 4.8 LPM.  LV EF 5-10%.     Right Ventricle  Global right ventricular function is moderately to severely reduced.     Pericardium  Small circumferential pericardial effusion is  present without any hemodynamic  significance.        Compared to Previous Study  There has been no change.  _____________________________________________________________________________  __                          Report approved by: Tonya Carpenter 04/13/2020 11:12 AM              _____________________________________________________________________________  __      XR Abdomen Port 1 View    Narrative    Exam: XR ABDOMEN PORT 1 VW, 4/13/2020 1:51 PM    Indication: cardiac arrest, on VA ECMO, would like to replace OG    Comparison: Abdominal radiograph 4/11/2020    Findings:   Portable supine AP radiograph the abdomen. Feeding tube tip projects  of the proximal degenerative. Gastric tube sequela the stomach.  Partially visualized ECMO cannulae and intra-aortic balloon pump with  inferior marker at the level of the superior endplate of L3. Relative  paucity of gas in the visualized abdomen. The osseous structures are  unremarkable.      Impression    Impression:   1. Feeding tube tip projects over the proximal jejunum. Gastric tube  tip coiled in the stomach.  2. Nonobstructive bowel gas pattern.    I have personally reviewed the examination and initial interpretation  and I agree with the findings.    RONNI EL MD   XR Chest Port 1 View    Narrative    TEMPORARY  4/14/2020 1:06 AM      History: Check endotracheal tube placement an ACLS cannula placement.     COMPARISON: 4/13/2020    FINDINGS:   Stable positioning of endotracheal tube, visualized enteric tubes,  esophageal temperature probe, ECMO cannula and intra-aortic balloon  pump. No focal airspace opacity. No pleural effusion. No pneumothorax.      Impression    IMPRESSION:   1. Stable positioning of support devices.  2. No focal airspace opacity.         Plan is continue VA ECMO support. Potential turndown today.      Janina Blake, RRT-ACCS / ECMO Specialist  4/14/2020 6:09 AM

## 2020-04-14 NOTE — PROGRESS NOTES
CARDIOLOGY-CSI PROGRESS NOTE  Odilia Gusman MRN:[id number] YOB: 1900  Date of Admission:4/11/2020    ASSESSMENT AND PLAN:   Patient Dino Bundy is a 55 y/o M with PMH of HTN, EtOH use who presented as VF cardiac arrest. Patient went to his girlfriend's house this morning (he biked there) and they decided to sit on the porch. While she was making coffee she heard him calling her and she found him on the floor. She called 911 and started CPR. EMS then arrived and transferred him to [facility]. He was VA cannulated 25F/17F ,8F reperfusion cannula  and IABP placed.     Plan for today (4/14):  -Another ECMO turndown on 4/15  -CT head  -Placde NJ tube, start feeds on 4/14  -Daily sedation weans        Neurology: # At risk for hypoxic brain injury  # Alcohol use disorder   Intubated, sedated, not paralyzed. Cooled to 34 degrees.  --continue versed 8 , fentanyl 100, propofol weaned off   - RASS goal -4 to -5  - CTH without ICH (4/11)  - neurocritical care following   - veeg when ruled out of COVID    - will monitor for alochol withdrawal whenever we wean of benzos, propofol   Cardiovascular / Hemodynamics: Refractory VF arrest.  MONTEZ to LAD.  Peripheral V-A ECMO inserted for VF arrest. LA 10.8   S/p proximal LAD stent (3.0x28mm synergy MONTEZ )for subtotal occlusion of pLAD.   ECMO: flow 4.7 L, sweep 2 , FiO2 = 70%   TTE: pericardial effusion, EF=5%, underfilled cavities (4/12)   Turndown on 4/14: maintains MAPs in 60 on 1.5 LPM, but LVEF <10%  EKG: sinus rhythm with PVC, 1mm st depression on V1   --start EPI on 4/13 for pulsatility  --continue ASA 81mg and ticagrelor 90mg BID  --ACT goal 160-180  --hold lipitor for now given likely hepatic injury during arrest  --hold ACE/ARB for now given likely reduced renal fxn after arrest  --holding beta blocker given shock    Pulmonary: # risk for aspiration pneumonia  # intubated for airway protection in setting of cardiac arrest   Vent Settings: FiO2 60% , PEEP  7   ETT in the midthoracic trachea .  Now weaning vent requirements.  Overbreathing vent ; tachypnoic today   CXR: Lines in stable position. Hazy upper lobe opacities   --wean vent as able  --daily CXR  Chest CT  --Q2h ABGs for now  --consider scheduled duonebs if signs of lung dz, currently PRN     GI and Nutrition: No known medical hx.   Transaminitis likely shock liver   EtOH use : thiamine, folate, MVD  --monitor BID LFTs  --Place NJ on 4/13, then feed on 4/14  --bowel regimen - on hold for now due to hypothermia  --GI Prophylaxis: PPI    Renal, Fluid and Electrolytes: Acute kidney injury   Likely ATN during arrest   Cr 1.14. UOP 0.6 L   --monitor urine output  --maintain K>3 and Mg>2    Infectious Disease: No signs of infection. Leukocytosis c/w arrest. Blood cultures collected.   --vancomycin/zosyn x5 days for ECMO  --daily blood cultures  --monitor for signs of infection given cooling, lines, and leukocytosis   Hematology and Oncology: Receiving heparin for ECMO and ASA/ticagrelor for MONTEZ.   --cryo PRN fibrinogen < 200; FFP for INR >2  --Transfuse for Hgb<10  --heparin gtt for ECMO with ACT goal 160-180  --US LE w/ arterial duplex per ECMO protocol   --DVT PPX: Heparin as above   Endocrinology: No known medical history. BG elevated.  --insulin gtt  --no stress dose steroids due to persistent hypotension - hydrocort mg IV Q8h  --f/u HgbA1c    Lines: PA catheter April 11, 2020  R femoral arterial and venous ECMO cannulae April 11, 2020  L femoral arterial line April 11, 2020  R radial arterial line April 11, 2020  ETT April 11, 2020  Marin catheter April 11, 2020  OG tube April 11, 2020  Restraint: needed    Current lines are required for patient management       Family update by me today: Yes     Code Status: FULL CODE    The pt was discussed and evaluated with Dr. Harrison, Cardiologist, Dr Damon, attending physician, who agrees with the assessment and plan above.     Mahesh Perez M.D.  [facility]  [place] [facility]  Cardiology  [phone]      HISTORY OF PRESENT ILLNESS:  Patient Dino Bundy is a 57 y/o M with PMH of HTN, EtOH use who presented as VF cardiac arrest. Patient went to his girlfriend's house this morning (he biked there) and they decided to sit on the porch. While she was making coffee she heard him calling her and she found him on the floor. She called 911 and started CPR. EMS then arrived and transferred him to [facility]. Patient did not complaint of any symptoms before the arrest    Radhapierre Hernandezshabnam: [phone]  Gumaro (son): [phone]  Rosa (mother):     Had chest pain on Tuesday night.  Had high blood pressure ; was not treated for that , has not seem physician for years. His BP was around 190-220 routinely.   He was recently in [place]      Witnessed arest (y/n): y  Home or public location (y/n): home  Bystander CPR (y/n): y  Time of 911 call:   Initial rhythm: VF  Did they have intermittent ROSC (y/n): n  # of shocks 11  Epi:  3 mg  Amio: 450 mg  Bicarb:  1 amps  EtCO2 en route: 38 (here in cath lab)   O2 sat en route:    Initial rhythm in the cath lab: vf  First AB.07 / 59/ 51/ 17 (Fio2 = 100%)  ECMO cannula size:   Meds given in the cath lab:      PAST MEDICAL HISTORY:  HTN  Alcohol use     Past surgical history:  Unable to obtain due to patient's condition    Family history:  Unable to obtain due to patient's condition    Social history  Alcohol use     MEDICATIONS:  PTA Meds  Prior to Admission medications    Not on File      Current Meds    albumin human  25 g Intravenous Once     aspirin  81 mg Per Feeding Tube Daily     ceFEPIme (MAXIPIME) IV  2 g Intravenous Q12H     docusate  100 mg Oral BID     [START ON 2020] folic acid  1 mg Oral or Feeding Tube Daily     folic acid  1 mg Intravenous Daily     lactated ringers  1,000 mL Intravenous Once     lidocaine  5 mL Topical Once     multivitamins w/minerals  15 mL Oral or Feeding Tube Daily     oxymetazoline  2 spray Topical BID      pantoprazole (PROTONIX) IV  40 mg Intravenous Daily     sodium chloride (PF)  3 mL Intracatheter Q8H     thiamine  200 mg Intravenous TID     ticagrelor  90 mg Oral or Feeding Tube BID     vancomycin place peng - receiving intermittent dosing  1 each Does not apply See Admin Instructions     [START ON 4/20/2020] thiamine  100 mg Oral or Feeding Tube Daily     [START ON 4/15/2020] thiamine  100 mg Oral or Feeding Tube TID     Infusion Meds    dextrose       dextrose       dextrose       EPINEPHrine IV infusion ADULT 0.01 mcg/kg/min (04/14/20 0900)     fentaNYL 75 mcg/hr (04/14/20 0900)     heparin 2 unit/mL in 0.9% NaCl 3 mL/hr (04/14/20 0900)     HEParin 900 Units/hr (04/14/20 0900)     insulin (regular) 2 Units/hr (04/14/20 0900)     - MEDICATION INSTRUCTIONS -       midazolam 2 mg/hr (04/14/20 0900)     norepinephrine Stopped (04/13/20 0929)     propofol (DIPRIVAN) infusion Stopped (04/12/20 1310)       ALLERGIES:    Not on File    REVIEW OF SYSTEMS:  A 10 point review of systems was negative except as noted above.    SOCIAL HISTORY:   Social History     Socioeconomic History     Marital status: Not on file     Spouse name: Not on file     Number of children: Not on file     Years of education: Not on file     Highest education level: Not on file   Occupational History     Not on file   Social Needs     Financial resource strain: Not on file     Food insecurity     Worry: Not on file     Inability: Not on file     Transportation needs     Medical: Not on file     Non-medical: Not on file   Tobacco Use     Smoking status: Not on file   Substance and Sexual Activity     Alcohol use: Not on file     Drug use: Not on file     Sexual activity: Not on file   Lifestyle     Physical activity     Days per week: Not on file     Minutes per session: Not on file     Stress: Not on file   Relationships     Social connections     Talks on phone: Not on file     Gets together: Not on file     Attends [employer] service: Not  "on file     Active member of club or organization: Not on file     Attends meetings of clubs or organizations: Not on file     Relationship status: Not on file     Intimate partner violence     Fear of current or ex partner: Not on file     Emotionally abused: Not on file     Physically abused: Not on file     Forced sexual activity: Not on file   Other Topics Concern     Not on file   Social History Narrative     Not on file         FAMILY MEDICAL HISTORY:   No family history on file.      PHYSICAL EXAM:      Temp 97.7  F (36.5  C)   Resp 15   Ht 1.778 m (5' 10\")   Wt 88.6 kg (195 lb 5.2 oz)   SpO2 100%   BMI 28.03 kg/m        GENERAL APPEARANCE: intubated, sedated   Head: NC/AT  EYES: PERRL, pupils equal  HENT: Mouth without ulcers or lesions  NECK: Supple, no goiter  Pulmonary: Lungs clear to auscultation with equal breath sounds bilaterally, no clubbing or cyanosis  CV: Regular rhythm, normal rate, no rub.  GI: Soft, nontender, normal bowel sounds, no HSM   MS: No evidence of inflammation in joints, no muscle tenderness  SKIN: No rash, warm, drym ECMO cannulas in R femoral artery, vein , IABP in L femoral groin  NEURO: sedated     LABS:   CMP  Recent Labs   Lab 04/14/20  0636 04/14/20  0336 04/14/20  0226 04/13/20  2354 04/13/20  2135  04/13/20  1614 04/13/20  1002  04/13/20  0346  04/12/20  0336   NA  --  150*  --   --  149*  --  146* 147*  --  149*   < > 146*   POTASSIUM  --  4.2  --   --  4.5  --  4.4 4.5  --  4.7   < > 4.0   CHLORIDE  --  120*  --   --  118*  --  117* 115*  --  116*   < > 113*   CO2  --  25  --   --  25  --  23 26  --  25   < > 25   ANIONGAP  --  4  --   --  6  --  6 6  --  8   < > 8   GLC 97 95 95 111* 150*  153*   < > 174*  177* 121*  124*   < > 112*   < > 122*   BUN  --  41*  --   --  44*  --  43* 42*  --  36*   < > 25   CR  --  2.23*  --   --  2.42*  --  2.60* 2.68*  --  2.46*   < > 1.61*   GFRESTIMATED  --  32*  --   --  29*  --  27* 26*  --  18*   < > 30*   GFRESTBLACK  --  37* "  --   --  34*  --  31* 30*  --  21*   < > 35*   COLEEN  --  8.0*  --   --  7.8*  --  7.8* 7.3*  --  7.7*   < > 6.8*   MAG  --  2.6*  --   --  2.3  --  2.2 2.2  --  2.3   < > 1.9   PHOS  --  4.4  --   --   --   --   --   --   --  6.6*  --  4.6*   PROTTOTAL  --  5.2*  --   --  5.0*  --  4.9* 4.8*  --  4.8*   < > 5.0*   ALBUMIN  --  2.5*  --   --  2.6*  --  2.6* 2.8*  --  3.1*   < > 3.4   BILITOTAL  --  0.7  --   --  0.7  --  0.8 1.0  --  1.2   < > 0.9   ALKPHOS  --  35*  --   --  34*  --  32* 27*  --  26*   < > 26*   AST  --  724*  --   --  795*  --  905* 996*  --  1,162*   < > 1,510*   ALT  --  148*  --   --  155*  --  162* 167*  --  175*   < > 215*    < > = values in this interval not displayed.     CBC  Recent Labs   Lab 04/14/20 0336 04/13/20 2135 04/13/20 1614 04/13/20  1002   HGB 7.3* 7.6* 7.9* 7.9*   WBC 14.2* 14.2* 15.3* 10.2   RBC 2.32* 2.37* 2.46* 2.54*   HCT 22.9* 22.8* 23.5* 24.2*   MCV 99 96 96 95   MCH 31.5 32.1 32.1 31.1   MCHC 31.9 33.3 33.6 32.6   RDW 15.5* 15.4* 15.4* 15.4*   PLT 94* 96* 110* 100*     INR  Recent Labs   Lab 04/14/20 0336 04/13/20 2135 04/13/20 1614 04/13/20  1002   INR 1.23* 1.29* 1.37* 1.45*   PTT 67* 82* 73* 86*     ABG  Recent Labs   Lab 04/14/20  0757 04/14/20  0636 04/14/20  0336 04/14/20  0226   PH 7.46* 7.45 7.43 7.41   PCO2 34* 36 37 39   PO2 174* 203* 148* 176*   HCO3 24 25 25 25   O2PER 40.0 40.0 40.0 40.0      I have seen and examined the patient with the CSI team. I agree with the assessment and plan of the note above.I have reviewed pertinent labs.     Natalio Damon MD  Interventional Cardiology  Pager: [phone]

## 2020-04-15 LAB
7AMINOCLONAZEPAM BLD CFM-MCNC: NEGATIVE NG/ML
ALPRAZ SERPL-MCNC: NEGATIVE NG/ML
AMPHETAMINES SPEC-MCNC: NEGATIVE NG/ML
APAP BLD-MCNC: NEGATIVE UG/ML
BACTERIA SPEC CULT: NO GROWTH
BARBITURATES SPEC-MCNC: NEGATIVE UG/ML
BENZODIAZ SERPL QL: POSITIVE
BENZODIAZ SPEC-MCNC: POSITIVE NG/ML
BUPRENORPHINE SERPLBLD-MCNC: NEGATIVE NG/ML
CARBOXYTHC BLD-MCNC: NEGATIVE NG/ML
CARISOPRODOL IA: NEGATIVE UG/ML
CHLORDIAZEP SERPL-MCNC: NEGATIVE NG/ML
CLONAZEPAM SERPL CFM-MCNC: NEGATIVE NG/ML
COCAINE METABOLITE IA: NEGATIVE NG/ML
DECLARED MEDICATIONS: ABNORMAL
DESALKYLFLURAZ SERPL-MCNC: NEGATIVE NG/ML
DIAZEPAM SERPL-MCNC: NEGATIVE NG/ML
ETHANOL BLD-MCNC: NEGATIVE GM/DL
FENTANYL BLD CFM-MCNC: 0.8 NG/ML
FENTANYL IA: POSITIVE NG/ML
FENTANYL SPEC QL: POSITIVE
FLURAZEPAM SERPL-MCNC: NEGATIVE NG/ML
GABAPENTIN IA: NEGATIVE UG/ML
LORAZEPAM BLD CFM-MCNC: NEGATIVE NG/ML
Lab: NORMAL
MEPERIDINE SERPLBLD-MCNC: NEGATIVE NG/ML
METHADONE BLD-MCNC: NEGATIVE NG/ML
MIDAZOLAM BLD CFM-MCNC: 114 NG/ML
NORCHLORDIAZEP SERPL-MCNC: NEGATIVE NG/ML
NORDIAZEPAM SERPL-MCNC: NEGATIVE NG/ML
NORFENTANYL BLD CFM-MCNC: NEGATIVE NG/ML
NSE SERPL-MCNC: 32.4 UG/L (ref 3.7–8.9)
NSE SERPL-MCNC: 50.2 UG/L (ref 3.7–8.9)
OPIATES SPEC-MCNC: NEGATIVE NG/ML
OTHER DRUGS DETECTED: POSITIVE
OXAZEPAM SERPL-MCNC: NEGATIVE NG/ML
OXYCODONE SERPLBLD SCN-MCNC: NEGATIVE NG/ML
PCP SPEC-MCNC: NEGATIVE NG/ML
PLATELET INHIBITION WITH AA: 100 %
PLATELET INHIBITION WITH ADP: 98 %
PROPOXYPH SPEC-MCNC: NEGATIVE NG/ML
SPECIMEN SOURCE: NORMAL
TEMAZEPAM SERPL-MCNC: NEGATIVE NG/ML
TRAMADOL BLD-MCNC: NEGATIVE NG/ML
TRIAZOLAM BLD-MCNC: NEGATIVE NG/ML

## 2020-04-15 ASSESSMENT — ACTIVITIES OF DAILY LIVING (ADL): ADLS_ACUITY_SCORE: 26

## 2020-04-15 NOTE — DEATH PRONOUNCEMENT
D/I:   Son Gumaro decided to withdrawal care.  Pt has anoxic brain injury per EEG and CT. Pt has very poor hear function per ECHO turn down earlier this AM.  Family members ( one at a time ) came to see Pt.  Next of kin son Gumaro Bundy came last and both ECMO and IABP were shut off @ 5. Versed Gtt was increased and extra bump of Fentanyl 100 mcg was given. Pt's son Gumaro stayed @ bedside and MD declared time of death to be 2224 on 2020.  Pt's only belongings ( tennis shoes and socks ) given to son. Son declined offer by MD for autopsy. Family unsure of  home but son given phone number for security and will call them when  home is decided. [employer] contacted for possible donation.

## 2020-04-15 NOTE — DISCHARGE SUMMARY
[facility] Discharge Summary    Dino Bundy MRN# [id number]   Age: 54 year old YOB: 1965     Date of Admission:  2020  Date of Discharge::  2020  Admitting Physician:  Jordon Riggs MD  Discharge Physician:  Stiven Melendez MD           Discharge Diagnosis:   Cardiac arrest  Hypoxic brain injury          Procedures:   ECMO          Discharge Medications:     There are no discharge medications for this patient.             Brief History of Illness:   Patient Dino Bundy is a 55 y/o M with PMH of HTN, EtOH use who presented as VF cardiac arrest. Patient went to his girlfriend's house this morning (he biked there) and they decided to sit on the porch. While she was making coffee she heard him calling her and she found him on the floor. She called 911 and started CPR. EMS then arrived and transferred him to [facility]. Patient did not complaint of any symptoms before the arrest     Radhapierre Hernandezshabnam: [phone]  Gumaro (son): [phone]  Rosa (mother):      Had chest pain on Tuesday night.  Had high blood pressure ; was not treated for that , has not seem physician for years. His BP was around 190-220 routinely.   He was recently in [place]       Witnessed arest (y/n): y  Home or public location (y/n): home  Bystander CPR (y/n): y  Time of 911 call:   Initial rhythm: VF  Did they have intermittent ROSC (y/n): n  # of shocks 11  Epi:  3 mg  Amio: 450 mg  Bicarb:  1 amps  EtCO2 en route: 38 (here in cath lab)   O2 sat en route:     Initial rhythm in the cath lab: vf  First AB.07 / 59/ 51/ 17 (Fio2 = 100%)  ECMO cannula size:   Meds given in the cath lab:          Hospital Course:   Patient Dino Bundy is a 55 y/o M with PMH of HTN, EtOH use who presented as VF cardiac arrest. Patient went to his girlfriend's house this morning (he biked there) and they decided to sit on the porch. While she was making coffee she heard him calling her and she found him on the floor. She called 911 and  started CPR. EMS then arrived and transferred him to [facility]. He was VA cannulated 25F/17F ,8F reperfusion cannula  and IABP placed.      Plan for today ():  -Another ECMO turndown on 4/15  -CT head  -Placde NJ tube, start feeds on   -Daily sedation weans           Neurology: # At risk for hypoxic brain injury  # Alcohol use disorder   Intubated, sedated, not paralyzed. Cooled to 34 degrees. Passed away 20.    Cardiovascular / Hemodynamics: Refractory VF arrest.  MONTEZ to LAD.  Peripheral V-A ECMO inserted for VF arrest. LA 10.8. S/p proximal LAD stent (3.0x28mm synergy MONTEZ )for subtotal occlusion of pLAD.   ECMO: flow 4.7 L, sweep 2 , FiO2 = 70%   TTE: pericardial effusion, EF=5%, underfilled cavities ()   Turndown on : maintains MAPs in 60 on 1.5 LPM, but LVEF <10%  EKG: sinus rhythm with PVC, 1mm st depression on V1   --After discussion with family, decided to make comfort care on . Patient passed away.          Discharge Instructions and Follow-Up:   Discharge diet: None   Discharge activity: Activity as tolerated   Discharge follow-up: None           Discharge Disposition:   Patient  during this admission      Stiven Melendez MD     Thank you for allowing me to care for this patient. This has been discussed with the attending physician.    Stiven Melendez MD  Cardiology Fellow, PGY-6    I have seen and examined the patient with the CSI team. I agree with the assessment and plan of the discharge summary note above.I have reviewed pertinent labs. More than 30 minutes were spent organizing the patient's discharge.    Natalio Damon MD  Interventional Cardiology  Pager: [phone]

## 2020-04-15 NOTE — PROGRESS NOTES
Withdraw of care initiated around 2145. All family members here in lobby, came to bedside 1 by 1 to say goodbye. Son in room while shutting everything down. Clamped out circuit at 2207. Cart cleaned, all items returned to proper places and cart stocked.     Whitley Anaya RN, ECMO

## 2020-04-16 ENCOUNTER — TELEPHONE (OUTPATIENT)
Dept: CARDIOLOGY | Facility: CLINIC | Age: 55
End: 2020-04-16

## 2020-04-16 LAB
BACTERIA SPEC CULT: NO GROWTH
Lab: NORMAL
SPECIMEN SOURCE: NORMAL

## 2020-04-16 NOTE — TELEPHONE ENCOUNTER
[facility] Call Center    Phone Message    May a detailed message be left on voicemail: yes     Reason for Call: Other: Nadeen calling from [facility] home to request that Dr. Riggs completes the cause of death for Dino so that cremation can be authorized for him. If there are any questions, please give Nadeen a call at [phone].     Action Taken: Message routed to:  [facility] ([facility]):  Cardio    Travel Screening: Not Applicable

## 2020-04-17 LAB
MISCELLANEOUS TEST: NORMAL
NSE SERPL-MCNC: 159.1 UG/L (ref 3.7–8.9)

## 2020-04-17 NOTE — TELEPHONE ENCOUNTER
[facility] Call Center    Phone Message    May a detailed message be left on voicemail: yes     Reason for Call: Nadeen called from [facility] stating that Dr. Ramirez/Dr. Riggs referred patients cause of death to be completed by [facility] Examin[facility]. Nadeen just called their office. This death hasn't been reported to them yet, so they can't give them cremation authorization until someone from the clinic calls their office to report this  death. Burial service is scheduled for next Tuesday,  and they need the ashes back on Monday for this to happen. Please call Nadeen back if you have any questions at [phone].     Action Taken: Message routed to:  [facility] ([facility] Cardiology     Travel Screening: Not Applicable

## 2020-04-17 NOTE — PROCEDURES
[facility] #-4 (Day 4 of Video-EEG Monitoring)    DATE OF RECORDIN2020    DURATION OF RECORDIN hours, 58 minutes.    CLINICAL SUMMARY:  This video-EEG monitoring procedure was performed in evaluation of encephalopathy in Dino Bundy, who was reported to have sustained cardiac arrest.  He was intubated and mechanically ventilated throughout the period of monitoring, with continuous infusions of midazolam and fentanyl.      TECHNICAL SUMMARY:  This continuous EEG monitoring procedure was performed with 23 scalp electrodes in 10-20 system placements, and additional scalp, precordial and other surface electrodes used for electrical referencing and artifact detection.  Video monitoring was utilized and periodically reviewed by EEG technologists and the physician for electroclinical correlation.     EEG ACTIVITIES DURING COMA:  During ongoing sedated coma, there was no evidence of wake-sleep cycling.    There was continuous generalized suppression of all electrocerebral activities, with no activities exceeding 5 microvolts in amplitude.    There was no evidence of reactivity to sensory stimulation.      No interictal epileptiform abnormalities and no electrographic seizures were recorded.      SUMMARY OF DAY 4 OF VIDEO-EEG MONITORING:    The EEG recording during sedated coma was abnormal due to continuous, severe, generalized suppression of all electrocerebral activities.    No interictal epileptiform abnormalities and no electrographic seizures were recorded.      SUMMARY OF 4 DAYS OF VIDEO-EEG MONITORING:    Over the course of 4 days of EEG monitoring during sedated coma, there was no evidence of wake-sleep cycling.    The recording was persistently abnormal due to continuous generalized severe amplitude suppression of all electrocerebral activities.    No interictal epileptiform abnormalities and no electrographic seizures were recorded over the 4 days of monitoring.    These findings indicate  profound electrographic encephalopathy.  Clinical correlation is recommended.   Frank Jarrett M.D., Professor of Neurology       D: 2020   T: 2020   MT: SCOTT      Name:     WELLINGTON GALLARDO   MRN:      [id number]        Account:        [id number]   :      1965           Procedure Date: 2020      Document: [id number]

## 2020-04-18 LAB
BACTERIA SPEC CULT: NO GROWTH
SPECIMEN SOURCE: NORMAL

## 2020-04-19 LAB
BACTERIA SPEC CULT: NO GROWTH
SPECIMEN SOURCE: NORMAL

## 2020-04-20 LAB
BACTERIA SPEC CULT: NO GROWTH
SPECIMEN SOURCE: NORMAL

## 2020-04-21 LAB — S100 CA BINDING PROTEIN B SER-MCNC: 1556 NG/L (ref 0–96)

## 2020-09-08 ENCOUNTER — COMMUNICATION - HEALTHEAST (OUTPATIENT)
Dept: INTERNAL MEDICINE | Facility: CLINIC | Age: 55
End: 2020-09-08

## 2021-05-31 VITALS — BODY MASS INDEX: 28.49 KG/M2 | WEIGHT: 188 LBS | HEIGHT: 68 IN

## 2021-05-31 VITALS — HEIGHT: 68 IN | BODY MASS INDEX: 29.55 KG/M2 | WEIGHT: 195 LBS

## 2021-05-31 VITALS — HEIGHT: 68 IN | WEIGHT: 193.56 LBS | BODY MASS INDEX: 29.34 KG/M2

## 2021-06-01 ENCOUNTER — RECORDS - HEALTHEAST (OUTPATIENT)
Dept: ADMINISTRATIVE | Facility: CLINIC | Age: 56
End: 2021-06-01

## 2021-06-01 NOTE — PROGRESS NOTES
BP Readings from Last 20 Encounters:   09/13/19 (!) 148/92   09/09/19 (!) 198/117   12/29/17 (!) 180/102   12/27/17 138/89   12/14/17 (!) 158/108   12/08/17 (!) 150/93

## 2021-06-01 NOTE — PATIENT INSTRUCTIONS - HE
Please follow up if you have any further issues.    You may contact me by phone or [facility] if you are worsening or if things are not improving.    Thank you for coming in today.

## 2021-06-03 VITALS
SYSTOLIC BLOOD PRESSURE: 198 MMHG | HEART RATE: 82 BPM | DIASTOLIC BLOOD PRESSURE: 117 MMHG | RESPIRATION RATE: 18 BRPM | OXYGEN SATURATION: 99 % | WEIGHT: 187 LBS | BODY MASS INDEX: 28.34 KG/M2 | HEIGHT: 68 IN | TEMPERATURE: 98.3 F

## 2021-06-03 VITALS
WEIGHT: 184 LBS | DIASTOLIC BLOOD PRESSURE: 92 MMHG | BODY MASS INDEX: 28.39 KG/M2 | HEART RATE: 77 BPM | OXYGEN SATURATION: 97 % | SYSTOLIC BLOOD PRESSURE: 148 MMHG

## 2021-06-07 NOTE — TELEPHONE ENCOUNTER
Refill Approved    Rx renewed per Medication Renewal Policy. Medication was last renewed on 9/13/19.    Cecy Morales, [facility] Triage/Med Refill 3/20/2020     Requested Prescriptions   Pending Prescriptions Disp Refills     sildenafiL (VIAGRA) 100 MG tablet 30 tablet 2     Sig: Take 1 tablet (100 mg total) by mouth daily as needed for erectile dysfunction.       Medications for Impotence Refill Protocol Passed - 3/20/2020 11:22 AM        Passed - PCP or prescribing provider visit in last year     Last office visit with prescriber/PCP: 9/13/2019 Camden David MD OR same dept: 9/13/2019 Camden David MD OR same specialty: 9/13/2019 Camden David MD  Last physical: Visit date not found Last MTM visit: Visit date not found   Next visit within 3 mo: Visit date not found  Next physical within 3 mo: Visit date not found  Prescriber OR PCP: Camden David MD  Last diagnosis associated with med order: 1. Erectile dysfunction, unspecified erectile dysfunction type  - sildenafiL (VIAGRA) 100 MG tablet; Take 1 tablet (100 mg total) by mouth daily as needed for erectile dysfunction.  Dispense: 30 tablet; Refill: 2    If protocol passes may refill for 12 months if within 3 months of last provider visit (or a total of 15 months).

## 2021-06-14 NOTE — PROGRESS NOTES
ASSESSMENT:  1. Chronic pain of right knee  Ambulatory referral to Orthopedics   2. Unilateral recurrent inguinal hernia without obstruction or gangrene  Ambulatory referral to General Surgery   3. Heel pain, chronic, left         PLAN:  Patient here to establish care today. He has some longstanding issues and he is trying to get many of them taken care of prior to . His wife is recently passed away and he has been dealing with insurance issues which have delayed him pursuing some of these issues. For longstanding hernia, we will refer on to surgery for further evaluation. Also given referral to orthopedics today for chronic knee pain. Patient encouraged to set up a physical in the next several months so we can work on getting health maintenance activities up to date.     No problem-specific Assessment & Plan notes found for this encounter.      There are no Patient Instructions on file for this visit.    Orders Placed This Encounter   Procedures     Ambulatory referral to General Surgery     Referral Priority:   Routine     Referral Type:   Surgical     Referral Reason:   Evaluation and Treatment     Requested Specialty:   General Surgery     Number of Visits Requested:   1     Ambulatory referral to Orthopedics     Referral Priority:   Routine     Referral Type:   Consultation     Referral Reason:   Evaluation and Treatment     Requested Specialty:   Orthopedics     Number of Visits Requested:   1     There are no discontinued medications.    No Follow-up on file.    CHIEF COMPLAINT:  Chief Complaint   Patient presents with     Establish Care     Patient here for a surgery referral for a hernia        HISTORY OF PRESENT ILLNESS:  Dino is a 52 y.o. male here today to establish care. Patient's wife is recently , but was a long time patient at this clinic with Dr. Casper. Since his wife's passing he has been dealing with insurance issues that have delayed him seeking care for some chronic issues. He  "is hoping he can get some of these taken care of by the end of this month.   Patient has had chronic right knee pain, and believes he has had meniscal tears in the past that have never been repaired. Knee has started to bother him more than previously over the past several months and he is interested in further evaluation for this. Pain and swelling occur fairly frequently. Pain occurs behind the knee and on the lateral aspect of the right knee. Pain is somewhat dependent on activity level.  Secondly patient has had a longstanding inguinal hernia that has gotten larger and more bothersome over time. He would like to see a surgeon regarding this as it has become bothersome now on an almost daily basis. He believes he has had a minor hernia at least for the past 5-10 years.   As far as medical history goes, patient is quite healthy. No diagnosed chronic conditions, although BP is vazquez today. We discussed following up on this as well as health maintenance activities. Patient does have a healthy lifestyle as far as diet and activity level go.     REVIEW OF SYSTEMS:      Pertinent items are noted in HPI.  All other systems are negative  PFSH:  Reviewed, no changes      TOBACCO USE:  History   Smoking Status     Never Smoker   Smokeless Tobacco     Not on file       VITALS:  Vitals:    12/08/17 1412 12/08/17 1438   BP: (!) 150/98 (!) 150/93   Patient Site: Left Arm    Patient Position: Sitting    Cuff Size: Adult Regular    Pulse: 86    Resp: 16    Temp: 99  F (37.2  C)    TempSrc: Oral    Weight: 193 lb 9 oz (87.8 kg)    Height: 5' 7.5\" (1.715 m)      Wt Readings from Last 3 Encounters:   12/08/17 193 lb 9 oz (87.8 kg)       PHYSICAL EXAM:   BP (!) 150/93  Pulse 86  Temp 99  F (37.2  C) (Oral)   Resp 16  Ht 5' 7.5\" (1.715 m)  Wt 193 lb 9 oz (87.8 kg)  BMI 29.87 kg/m2  General appearance: alert, appears stated age and cooperative  Ears: normal TM's and external ear canals both ears  Nose: Nares normal. Septum " midline. Mucosa normal. No drainage or sinus tenderness.  Throat: lips, mucosa, and tongue normal; teeth and gums normal  Lungs: clear to auscultation bilaterally  Heart: regular rate and rhythm, S1, S2 normal, no murmur, click, rub or gallop  Neurologic: Grossly normal  Psychologic: Mood and affect normal.      DATA REVIEWED:  Additional History from Old Records Summarized (2): 2  Decision to Obtain Records (1): 0  Radiology Tests Summarized or Ordered (1): 0  Labs Reviewed or Ordered (1): 0  Medicine Test Summarized or Ordered (1): 0  Independent Review of EKG or X-RAY(2 each): 0    The visit lasted a total of 40 minutes face to face with the patient. Over 50% of the time was spent counseling and educating the patient about plan of care.    MEDICATIONS:  No current outpatient prescriptions on file.     No current facility-administered medications for this visit.        This note has been dictated using voice recognition software. Any grammatical or context distortions are unintentional and inherent to the software

## 2021-06-14 NOTE — PROGRESS NOTES
"HPI:  Dino Bundy is a 52 y.o. male who was referred to me by LAN Serrano for an inguinal hernia. He  presents today with complaints of a right inguinal hernia that has been present for many years.  It has been getting bigger over time.  It does cause him discomfort.  It is most uncomfortable at the end of the day.  The hernia is reducible.    Allergies:Review of patient's allergies indicates no known allergies.    No past medical history on file.    Past Surgical History:   Procedure Laterality Date     EYE SURGERY Right    For orbital fracture  He has never had a screening colonoscopy    CURRENT MEDS:  No current outpatient prescriptions on file.     No current facility-administered medications for this visit.        Family History   Problem Relation Age of Onset     Hypertension Mother    Father  in a car accident     reports that he has never smoked. He has never used smokeless tobacco. He reports that he drinks alcohol. He reports that he does not use illicit drugs.  Consumes alcohol on a social basis    Review of Systems:   General: No complaints or constitutional symptoms  Skin: No complaints or symptoms   Hematologic/Lymphatic: No symptoms or complaints  Psychiatric: No symptoms or complaints  Endocrine: No excessive fatigue, no hypermetabolic symptoms reported  Respiratory: No cough, shortness of breath, or wheezing  Cardiovascular: No chest pain or dyspnea on exertion  Gastrointestinal: As per HPI  Musculoskeletal: No recent injuries reported  Neurological: No focal neurologic defects reported.      EXAM:  BP (!) 158/108 (Patient Site: Left Arm, Patient Position: Sitting, Cuff Size: Adult Regular)  Pulse 90  Ht 5' 7.5\" (1.715 m)  Wt 195 lb (88.5 kg)  SpO2 98%  BMI 30.09 kg/m2  Body mass index is 30.09 kg/(m^2).  General : Alert, cooperative, appears stated age   Skin: Skin color, texture, turgor normal, no rashes or lesions   Lymphatic: No obvious adenopathy, no swelling   Eyes: No scleral " icterus, pupils equal  HENT: No traumatic injury to the head or face, no gross abnormalities  Lungs: Normal respiratory effort, breath sounds equal bilaterally  Heart: Regular rate and rhythm  Abdomen: Soft and nontender.  Large reducible right inguinal hernia.  Bilateral descended testicles.  Musculoskeletal: No obvious swelling  Neurologic: Grossly intact    IMAGES:   None    Assessment/Plan:   1. Non-recurrent unilateral inguinal hernia without obstruction or gangrene    2. Screening for colon cancer        Dino Bundy is a 52 y.o. male with a right inguinal hernia.  I have discussed the pathophysiology of inguinal hernias at length as well as the surgical and non-operative management strategies.      In particular, the risks and benefits of laparoscopic vs. open inguinal hernia surgery were explained in detail which include, but are not limited to, bleeding, infection of the mesh, recurrence of the hernia, chronic pain, poor cosmesis, the need for reoperative intervention, injury to vital structures, blood clots, heart attack, stroke and death.  Additionally, the risks of observation were also discussed in detail which include, but are not limited to, chronic pain, enlargement of the hernia, incarceration, strangulation and death.  Due to the large size of the hernia, I recommend an open approach.  He also has never had a screening colonoscopy.  He would like to be able to get this and the hernia repair done before the end of the year.  I told the patient that we could also perform a screening colonoscopy.    He understands everything that was discussed and has consented to proceed with surgery.   We will plan on scheduling an open right inguinal hernia repair and screening colonoscopy at his desired dates.       Francy Cornelius, DO  [facility]  [phone]

## 2021-06-15 NOTE — ANESTHESIA PREPROCEDURE EVALUATION
Anesthesia Evaluation      Patient summary reviewed   No history of anesthetic complications     Airway   Mallampati: II  Neck ROM: full   Pulmonary - negative ROS and normal exam    breath sounds clear to auscultation                         Cardiovascular - negative ROS and normal exam  Rhythm: regular  Rate: normal,         Neuro/Psych - negative ROS     Endo/Other    (+) obesity,      GI/Hepatic/Renal - negative ROS           Dental - normal exam                        Anesthesia Plan  Planned anesthetic: MAC    ASA 2   Induction: intravenous   Anesthetic plan and risks discussed with: patient  Anesthesia plan special considerations: antiemetics,   Post-op plan: routine recovery

## 2021-06-15 NOTE — ANESTHESIA POSTPROCEDURE EVALUATION
Patient: Dino Bundy  OPEN RIGHT INGUINAL HERNIA WITH MESH   Anesthesia type: MAC    Patient location: Phase II Recovery  Last vitals:   Vitals:    12/29/17 1412   BP: 126/72   Pulse: 91   Resp: 16   Temp:    SpO2: 94%     Post vital signs: stable  Level of consciousness: awake and responds to simple questions  Post-anesthesia pain: pain controlled  Post-anesthesia nausea and vomiting: no  Pulmonary: unassisted, return to baseline  Cardiovascular: stable and blood pressure at baseline  Hydration: adequate  Anesthetic events: no    QCDR Measures:  ASA# 11 - Fernanda-op Cardiac Arrest: ASA11B - Patient did NOT experience unanticipated cardiac arrest  ASA# 12 - Fernanda-op Mortality Rate: ASA12B - Patient did NOT die  ASA# 13 - PACU Re-Intubation Rate: NA - No ETT / LMA used for case  ASA# 10 - Composite Anes Safety: ASA10A - No serious adverse event    Additional Notes:

## 2021-06-15 NOTE — ANESTHESIA CARE TRANSFER NOTE
Last vitals:   Vitals:    12/29/17 1412   BP: 126/72   Pulse: 91   Resp: 16   Temp:    SpO2: 94%     Patient's level of consciousness is awake  Spontaneous respirations: yes  Maintains airway independently: yes  Dentition unchanged: yes  Oropharynx: oropharynx clear of all foreign objects    QCDR Measures:  ASA# 20 - Surgical Safety Checklist: WHO surgical safety checklist completed prior to induction  PQRS# 430 - Adult PONV Prevention: 4558F - Pt received => 2 anti-emetic agents (different classes) preop & intraop  ASA# 8 - Peds PONV Prevention: NA - Not pediatric patient, not GA or 2 or more risk factors NOT present  PQRS# 424 - Fernanda-op Temp Management: 4559F - At least one body temp DOCUMENTED => 35.5C or 95.9F within required timeframe  PQRS# 426 - PACU Transfer Protocol: - Transfer of care checklist used  ASA# 14 - Acute Post-op Pain: ASA14B - Patient did NOT experience pain >= 7 out of 10

## 2021-06-15 NOTE — ANESTHESIA PREPROCEDURE EVALUATION
Anesthesia Evaluation      Patient summary reviewed   No history of anesthetic complications     Airway   Mallampati: I  Neck ROM: full   Pulmonary - negative ROS and normal exam                          Cardiovascular - negative ROS and normal exam  Exercise tolerance: > or = 4 METS  (-) murmur  Rhythm: regular  Rate: normal,    no murmur      Neuro/Psych - negative ROS     Endo/Other - negative ROS      GI/Hepatic/Renal - negative ROS           Dental - normal exam                        Anesthesia Plan  Planned anesthetic: MAC    ASA 1   Induction: intravenous   Anesthetic plan and risks discussed with: patient    Post-op plan: routine recovery

## 2021-06-15 NOTE — ANESTHESIA CARE TRANSFER NOTE
Last vitals:   Vitals:    12/27/17 0803   BP:    Pulse: 78   Resp: 12   Temp: 36.6  C (97.9  F)   SpO2: 97%   b/p 145/94  Patient's level of consciousness is awake  Spontaneous respirations: yes  Maintains airway independently: yes  Dentition unchanged: yes  Oropharynx: oropharynx clear of all foreign objects    QCDR Measures:  ASA# 20 - Surgical Safety Checklist: WHO surgical safety checklist completed prior to induction  PQRS# 430 - Adult PONV Prevention: 4558F - Pt received => 2 anti-emetic agents (different classes) preop & intraop  ASA# 8 - Peds PONV Prevention: NA - Not pediatric patient, not GA or 2 or more risk factors NOT present  PQRS# 424 - Fernanda-op Temp Management: 4559F - At least one body temp DOCUMENTED => 35.5C or 95.9F within required timeframe  PQRS# 426 - PACU Transfer Protocol: - Transfer of care checklist used  ASA# 14 - Acute Post-op Pain: ASA14B - Patient did NOT experience pain >= 7 out of 10   Jessika Donaldson

## 2021-06-17 NOTE — PATIENT INSTRUCTIONS - HE
Patient Instructions by Cleo Casper CNP at 9/9/2019  2:20 PM     Author: Cleo Casper CNP Service: -- Author Type: Nurse Practitioner    Filed: 9/12/2019 12:26 PM Encounter Date: 9/9/2019 Status: Addendum    : Cleo Casper CNP (Nurse Practitioner)    Related Notes: Original Note by Cleo Casper CNP (Nurse Practitioner) filed at 9/9/2019  4:19 PM       I would take the naproxen over-the-counter 2 tablets twice daily for discomfort and inflammation.  Ice, elevate, reduce your workload and do not crawl around on your knees    Antibiotics for possible infection of skin.      If area becomes much more swollen or red or painful or you get a fever, please go to the emergency room.      Recheck next week with orthopedics or sooner to primary care if worsening or return to the walk-in clinic.        Patient Education     Bursitis  You have bursitis. This is an inflammation of the bursa. These are small, fluid-filled sacs that surround the larger joints of the body. The bursa help the muscles and tendons move smoothly over the joints.  Bursitis often happens in the shoulder. But it can also affect the elbows, hips, pelvis, knees, toes, and heels. Bursitis can be caused by injury, overuse of the joint, or infection of the bursa. Symptoms include pain and tenderness over a joint. Symptoms get worse with movement.  Bursitis is treated with an anti-inflammatory medicine and by resting the joint. More severe cases require injection of medicine directly into the bursa.    Home care    Rest the painful joint and protect it from movement. This will allow the inflammation to heal faster.    Apply an ice pack over the injured area for no more than 15 to 20 minutes. Do this every 3 to 6 hours for the first 24 to 48 hours. Keep using ice packs 3 to 4 times a day until the pain and swelling improves.     To make an ice pack, put ice cubes in a sealed plastic zip-lock bag. Wrap the bag in a clean, thin towel or  cloth. Never put ice or an ice pack directly on the skin. As the ice melts, be careful to avoid getting any wrap or splint wet.    You may take over-the-counter pain medicine to treat pain and inflammation, unless another medicine was prescribed. Anti-inflammatory pain medicines may be more effective. Talk with your provider beforeusing these medicines if you have chronic liver or kidney disease, or ever had a stomach ulcer or GI (gastrointestinal) bleeding.    As your symptoms improve, slowly begin to move the joint. Do not overuse the joint. This may cause the symptoms to flare up again.  When to seek medical advice  Call your healthcare provider right away if any of these occur:    Redness over the painful area    Increasing pain or swelling at the joint    Fever of 100.4 F (38 C) or above lasting for 24 to 48 hours  Date Last Reviewed: 11/21/2015 2000-2017 [employer]. [address] [place] [address], [place] [address]. All rights reserved. This information is not intended as a substitute for professional medical care. Always follow your healthcare professional's instructions.

## 2021-06-20 NOTE — LETTER
Letter by Camden David MD at      Author: Camden David MD Service: -- Author Type: --    Filed:  Encounter Date: 9/8/2020 Status: (Other)         Dino Bundy   [address] [facility]  [place] [place] [facility] [address]         Dear Dino,    I am sending you this letter to remind you that you are due for a physical in September or October if you are interested.    If you have any questions regarding the above, please feel free to contact me at [phone].        Sincerely,    Camden David MD  General Internal Medicine  [facility]

## 2021-06-28 NOTE — PROGRESS NOTES
Progress Notes by Camden David MD at 9/13/2019 10:30 AM     Author: Camden David MD Service: -- Author Type: Physician    Filed: 9/15/2019  9:08 PM Encounter Date: 9/13/2019 Status: Signed    : Camden David MD (Physician)              [facility]    Comprehensive and complex medical care - Chronic disease management - Shared decision making - Care coordination - Compassionate care    Patient advocacy - Rational deprescribing - Minimally disruptive medicine - Ethical focus - Customized care    ______________________________________________________________________     Date of Service: 9/13/2019  Primary Provider: Camden David MD    Patient Care Team:  Camden David MD as PCP - General (Internal Medicine)  Kya Moore FNP as Assigned PCP     ______________________________________________________________________     Patient's Pharmacy:    [id number] - [address] [facility] [address]  [facility] [address]  Phone: [phone] Fax: [phone]     Patient's Contacts:  Name Home Phone Work Phone Mobile Phone Relationship Lg AMAYA Robins [phone]   Friend      Patient's Insurance:    Payor: [employer] / Plan: [employer] / Product Type: PPO/POS/FFS /   ______________________________________________________________________   ______________________________________________________________________     Dino Bundy is a 54 y.o. male who comes in today for:     Chief Complaint   Patient presents with   ? Follow-up     RIGHT KNEE IS BETTER, HTN       Active Problem List:  Problem List as of 9/13/2019 Reviewed: 9/13/2019 10:41 PM by Camden David MD       Unprioritized    Colon cancer screening    Non-recurrent unilateral inguinal hernia without obstruction or gangrene           Past Medical History:   Diagnosis Date   ? Nonsmoker       Current  Outpatient Medications   Medication Sig   ? cephalexin 500 mg tablet Take 500 mg by mouth 4 (four) times a day for 10 days.   ? sildenafil (VIAGRA) 100 MG tablet Take 1 tablet (100 mg total) by mouth daily as needed for erectile dysfunction.     Social History     Occupational History   ? Occupation: [employer]   Tobacco Use   ? Smoking status: Never Smoker   ? Smokeless tobacco: Never Used   Substance and Sexual Activity   ? Alcohol use: Yes     Comment: Occasionally   ? Drug use: No   ? Sexual activity: Not on file      Social History     Social History Narrative    Works in [employer].        Wife  of ALL.        2 sons, 1 daughter.      Family History   Problem Relation Age of Onset   ? Hypertension Mother    ? Other Father         Accident   ? No Medical Problems Sister    ? No Medical Problems Brother    ? No Medical Problems Sister    ? No Medical Problems Daughter    ? No Medical Problems Son    ? No Medical Problems Son       Family history is otherwise noncontributory.    ______________________________________________________________________     History of present illness:    Comes in to establish care - has not seen an Internal Medicine (IM) md in the past.    Mainly in for follow up WALK IN CARE visit.  He presented there with acute cellulitis of the right leg near the knee and below.  He had severe swelling, redness and pain with this and difficulty walking.  Has had some knee problems in the past but this did not necessarily involve the joint.  Placed on cephalexin (Keflex) and the leg is doing a lot better at this time and his pain is doing well.  He will continue with this if he is able.    Recevied the TD update today.    Reviewed his elevated blood pressure.  He has had a number of elevated blood pressures in the clinic.  He denies any symptoms of this.  His wife  of ALL and he does have some anxiety coming into the clinic.  He also has pain when his blood pressure is  particularly high.  He has a blood pressure cuff at home.  His blood pressure at home has been reading low 130s to 80-90.  He does have a lot of stress in his life.  He would like to monitor this at this time.    Reviewed his erectile dysfunction (ED) and he would like a prescription for sildenafil (Viagra).  He has had success with this medication if he takes 1/3 of a pill.  He currently has a girlfriend.    We reviewed his other issues noted in the assessment but not specifically addressed in the HPI above.     Comprehensive review of systems was performed today with no major problems noted except as above.   ______________________________________________________________________     Exam:    Wt Readings from Last 3 Encounters:   09/13/19 184 lb (83.5 kg)   09/09/19 187 lb (84.8 kg)   12/28/17 188 lb (85.3 kg)     BP Readings from Last 3 Encounters:   09/13/19 (!) 148/92   09/09/19 (!) 198/117   12/29/17 (!) 180/102      BP (!) 148/92   Pulse 77   Wt 184 lb (83.5 kg)   SpO2 97%   BMI 28.39 kg/m     The patient is comfortable, no acute distress.  Mood good.  Insight is good.  No skin lesions or nodules of concern.  Ears clear.  Eyes are nonicteric.  Pupils equal and reactive.  Throat is clear.  Neck is supple without mass, no thyromegaly.  No cervical or epitrochlear adenopathy.  Heart regular rate and rhythm.  Lungs clear to auscultation bilaterally.  Respiratory effort good.  Abdomen soft and nontender.   Extremities show no edema.  The right leg shows residual redness right below the knee in the midline and is warm but there is minimal swelling at this time.  There is no bursal swelling.  Range of motion in the knee and gait are good.  No right knee effusion.    ______________________________________________________________________    Diagnostics:    No results found for this or any previous visit.  ______________________________________________________________________     Assessment:    1. Cellulitis of  right leg    2. Erectile dysfunction, unspecified erectile dysfunction type    3. Nonsmoker    4. Elevated blood pressure reading without diagnosis of hypertension      ______________________________________________________________________      PHQ-2 Total Score: 0 (9/15/2019  8:00 PM)    No data recorded  ______________________________________________________________________     BMI Readings from Last 1 Encounters:   09/13/19 28.39 kg/m      ______________________________________________________________________    Plan:    1. Continue current antibiotics.  2. Monitor blood pressure as an outpatient as he has been in the past.  3. Consider electrocardiogram (EKG) and other blood work in the future but patient wishes to follow at this time.  4. Sildenafil (Viagra) prescription given for erectile dysfunction (ED).  5. Follow up sooner if needed.    Camden David MD  [facility] Internal Medicine  [facility]     Return in about 1 year (around 9/13/2020) for physical.     No future appointments.      ______________________________________________________________________     Relevant ICD-10 codes and order associations:      ICD-10-CM    1. Cellulitis of right leg L03.115    2. Erectile dysfunction, unspecified erectile dysfunction type N52.9 sildenafil (VIAGRA) 100 MG tablet   3. Nonsmoker Z78.9    4. Elevated blood pressure reading without diagnosis of hypertension R03.0

## 2021-06-28 NOTE — PROGRESS NOTES
Progress Notes by Cleo Casper CNP at 9/9/2019  2:20 PM     Author: Cleo Casper CNP Service: -- Author Type: Nurse Practitioner    Filed: 9/12/2019 12:26 PM Encounter Date: 9/9/2019 Status: Signed    : Cleo Casper CNP (Nurse Practitioner)       Chief Complaint   Patient presents with   ? Leg Swelling     RT leg (knee and down) Ongoing for years        ASSESSMENT & PLAN:   Diagnoses and all orders for this visit:    Infrapatellar bursitis of right knee  -     Ambulatory referral to Orthopedics    Right leg swelling  -     Cancel: US Venous Leg Right; Future; Expected date: 09/09/2019  -     Cancel: US Venous Leg Right  -     US Venous Leg Right; Future; Expected date: 09/09/2019  -     Ambulatory referral to Orthopedics    Cellulitis of right lower extremity  -     Ambulatory referral to Orthopedics  -     cephalexin 500 mg tablet; Take 500 mg by mouth 4 (four) times a day for 10 days.  Dispense: 40 tablet; Refill: 0    Elevated blood pressure reading without diagnosis of hypertension        MDM:  Patient's occupation location of pain and erythema consistent with infrapatellar bursitis.  Area is warm and erythematous with history of persistent scratching.  Will cover for soft tissue infection with cephalexin.  This area is not boggy nor particularly swollen, so no immediate need to remove/test fluid.  Differential includes DVT with c/o intermittent leg swelling, gout, soft tissue cellulitis unrelated to bursitis, septic arthritis.  Patient denies fevers and the joint itself is at its baseline, some most likely to be infrapatellar bursitis with soft tissue infection.  Naproxen 2 tablets twice daily for 2 weeks and then as needed.  Orthopedic referral for this as well as chronic knee pain and arthritis.  Patient mentioned wanting another steroid injection if possible.  Patient's previous orthopedic provider has retired, so referral to [facility]    Had come to [place] discussion with  patient regarding his chronically untreated hypertension and risks to include heart disease, stroke.  Blood pressure today after recheck was 198/117.  Patient's previous primary care provider left the clinic, so helped patient to set up primary care follow-up for blood pressure as well as recheck of erythema following 4 days of cephalexin.  As patient is asymptomatic with this blood pressure, treatment can wait until this appointment for days.  Patient given information on signs of complicated hypertension and to seek emergency care if these are occurring.    Supportive care discussed.  See discharge instructions below for specific recommendations given.    At the end of the encounter, I discussed results, diagnosis, medications. Discussed red flags for immediate return to clinic/ER, as well as indications for follow up if no improvement. Patient and/or caregiver understood and agreed to plan. Patient was stable for discharge.    SUBJECTIVE    HPI:  HPI  Dion Bundy presents to the walk-in clinic with pain and erythema located below the right knee which worsened after jumping out of his truck last week.  States he has chronic knee pain and arthritis in this knee and was told he needed a knee replacement at some point, but was encouraged to wait 5 years due to age.  +Tender in area of erythema and lateral edge of right knee.  Patient has no history of gout.  Denies change in range of motion, which he says is usually reduced somewhat due to pain, or pain in joint itself.    Patient states right leg only on shin mostly has been swelling intermittently over the last week.  Patient has been wearing compression stockings which has been somewhat effective.  Patient works for [employer] and is sometimes on his knees related to this, but has not been much lately due to chronic knee issues.      Area over right knee and erythematous area has been dry and patient has been scratching this a lot lately.  Denies any  "other wounds or open areas.    Blood pressure is quite high today.  Patient brushes this off as something that is always the case, but has not ever addressed it with primary care.  Denies any complicated hypertension symptoms such as shortness of breath, chest pain or tightness, headaches, chronic dizziness or weakness.    History obtained from the patient.    No past medical history on file.    Active Ambulatory (Non-Hospital) Problems    Diagnosis   ? Non-recurrent unilateral inguinal hernia without obstruction or gangrene   ? Colon cancer screening       Family History   Problem Relation Age of Onset   ? Hypertension Mother        Social History     Tobacco Use   ? Smoking status: Never Smoker   ? Smokeless tobacco: Never Used   Substance Use Topics   ? Alcohol use: Yes     Comment: Occasionally       Review of Systems   Constitutional: Negative for chills and fever.   Respiratory: Negative for cough, chest tightness and shortness of breath.    Cardiovascular: Positive for leg swelling (intermittent rt shin, comes and goes). Negative for chest pain and palpitations.   Musculoskeletal: Positive for arthralgias (rt knee \"it always hurts\") and joint swelling.   Skin: Positive for color change and wound (has been scratching rt knee area - maybe some skin breakdown assoc with this).   Neurological: Negative for dizziness, weakness, light-headedness and headaches.       OBJECTIVE    Vitals:    09/09/19 1424 09/09/19 1619   BP: (!) 178/103 (!) 198/117   Patient Site: Right Arm Left Arm   Patient Position: Sitting Sitting   Cuff Size: Adult Regular Adult Large   Pulse: 82    Resp: 18    Temp: 98.3  F (36.8  C)    TempSrc: Oral    SpO2: 99%    Weight: 187 lb (84.8 kg)    Height: 5' 7.5\" (1.715 m)        Physical Exam   Constitutional: He is oriented to person, place, and time. He appears well-developed and well-nourished. No distress.   HENT:   Right Ear: External ear normal.   Left Ear: External ear normal.   Eyes: " Conjunctivae are normal. Right eye exhibits no discharge. Left eye exhibits no discharge.   Cardiovascular: Intact distal pulses.   Pulmonary/Chest: Effort normal.   Musculoskeletal: Normal range of motion. He exhibits tenderness (tender over area of erythema infrapatellar.). He exhibits no edema.   Reduce ROM of rt knee, but at baseline per patient.     Neurological: He is alert and oriented to person, place, and time.   Skin: Skin is warm and dry. Capillary refill takes less than 2 seconds. There is erythema (infrapatellar area /proximal shin extending entire base of kneecap.  +warmth.  No fluctuance/abscess. ).   Psychiatric: He has a normal mood and affect. His behavior is normal. Judgment and thought content normal.       Labs:  No results found for this or any previous visit (from the past 240 hour(s)).      Radiology:    Us Venous Leg Right    Result Date: 9/9/2019  EXAM: US VENOUS LEG RIGHT LOCATION: [facility] DATE/TIME: 9/9/2019 3:46 PM INDICATION: unilateral rt leg pain and swelling off and on x 1 week.  Have pt return to walk in clinic please. COMPARISON: None. TECHNIQUE: Routine exam without and with compression, augmentation, and duplex utilizing 2D gray-scale imaging, Doppler interrogation with color-flow and spectral waveform analysis. FINDINGS: The common femoral, femoral, popliteal, and segmentally visualized calf veins were evaluated. The opposite CFV was also included in the evaluation. Right leg veins are negative for deep venous thrombosis. No popliteal cysts.     CONCLUSION: 1.  Right leg veins are negative for DVT.      PATIENT INSTRUCTIONS:   Patient Instructions   I would take the naproxen over-the-counter 2 tablets twice daily for discomfort and inflammation.  Ice, elevate, reduce your workload and do not crawl around on your knees    Antibiotics for possible infection of skin.      If area becomes much more swollen or red or painful or you get a fever, please go to the emergency  room.      Recheck next week with orthopedics or sooner to primary care if worsening or return to the walk-in clinic.        Patient Education     Bursitis  You have bursitis. This is an inflammation of the bursa. These are small, fluid-filled sacs that surround the larger joints of the body. The bursa help the muscles and tendons move smoothly over the joints.  Bursitis often happens in the shoulder. But it can also affect the elbows, hips, pelvis, knees, toes, and heels. Bursitis can be caused by injury, overuse of the joint, or infection of the bursa. Symptoms include pain and tenderness over a joint. Symptoms get worse with movement.  Bursitis is treated with an anti-inflammatory medicine and by resting the joint. More severe cases require injection of medicine directly into the bursa.    Home care    Rest the painful joint and protect it from movement. This will allow the inflammation to heal faster.    Apply an ice pack over the injured area for no more than 15 to 20 minutes. Do this every 3 to 6 hours for the first 24 to 48 hours. Keep using ice packs 3 to 4 times a day until the pain and swelling improves.     To make an ice pack, put ice cubes in a sealed plastic zip-lock bag. Wrap the bag in a clean, thin towel or cloth. Never put ice or an ice pack directly on the skin. As the ice melts, be careful to avoid getting any wrap or splint wet.    You may take over-the-counter pain medicine to treat pain and inflammation, unless another medicine was prescribed. Anti-inflammatory pain medicines may be more effective. Talk with your provider beforeusing these medicines if you have chronic liver or kidney disease, or ever had a stomach ulcer or GI (gastrointestinal) bleeding.    As your symptoms improve, slowly begin to move the joint. Do not overuse the joint. This may cause the symptoms to flare up again.  When to seek medical advice  Call your healthcare provider right away if any of these occur:    Redness  over the painful area    Increasing pain or swelling at the joint    Fever of 100.4 F (38 C) or above lasting for 24 to 48 hours  Date Last Reviewed: 11/21/2015 2000-2017 [employer]. [address] [place] [address], [place] [address]. All rights reserved. This information is not intended as a substitute for professional medical care. Always follow your healthcare professional's instructions.

## 2025-04-16 NOTE — Clinical Note
Placed a new order. Potential access sites were evaluated for patency using ultrasound.   The right radial artery was selected. Access was obtained under with Sonosite guidance using a micropuncture 21 guage needle with direct visualization of needle entry.

## (undated) DEVICE — Device

## (undated) DEVICE — GUIDEWIRE VASC 0.014INX180CM RUNTHROUGH 25-1011

## (undated) DEVICE — CATH BALLOON EMERGE 2.5X15MM H7493918915250

## (undated) DEVICE — KIT ACCESSORY INTRO INFLATION SYS 20/30 PRIORITY 1000186-115

## (undated) DEVICE — KIT ARTERIAL LINE 2GA 12CM INTRO NDL ASK-04510-HF

## (undated) DEVICE — CATH ANGIO SUPERTORQUE PLUS JL4 6FRX100CM 533620

## (undated) DEVICE — CATH ANGIO INFINITI 3DRC 6FRX100CM 534676T

## (undated) DEVICE — 17FR 23CM ARTERIAL ECMO CANNULA WITH BIOLINE COATING

## (undated) DEVICE — CATH BALLOON NC EMERGE 3.25X15MM H7493926715320

## (undated) DEVICE — KIT HAND CONTROL ACIST 014644 AR-P54

## (undated) DEVICE — INTRODUCER SHEATH 4FRX40CM MICROPUNC PED G47946

## (undated) DEVICE — PACK HEART LEFT CUSTOM

## (undated) DEVICE — INTRO SHEATH 6FRX25CM PINNACLE RSS606

## (undated) DEVICE — INTRO SHEATH MICRO PLATINUM TIP 4FRX40CM 7274

## (undated) DEVICE — CATH QUATTRO 9.3FR  FEM INSTRN

## (undated) DEVICE — INTRODUCER SHEATH 8FRX24CM ARROW FLEX CL-07824

## (undated) DEVICE — CATH GUIDING BLUE YELLOW PTFE XB3.5 6FRX100CM 67005400

## (undated) DEVICE — CATH BALLOON NC EMERGE 3.50X15MM H7493926715350

## (undated) DEVICE — INTRO SHEATH 9FRX10CM PINNACLE RSS902

## (undated) DEVICE — VALVE HEMOSTASIS .096" COPILOT MECH 1003331

## (undated) DEVICE — MANIFOLD KIT ANGIO AUTOMATED 014613

## (undated) DEVICE — CANNULA VENOUS 25FR LONG

## (undated) DEVICE — WIRE GUIDE 0.035"X145CM AMPLATZ XSTIFF J THSCF-35-145-3-AES

## (undated) RX ORDER — FENTANYL CITRATE 50 UG/ML
INJECTION, SOLUTION INTRAMUSCULAR; INTRAVENOUS
Status: DISPENSED
Start: 2020-01-01

## (undated) RX ORDER — HEPARIN SODIUM 1000 [USP'U]/ML
INJECTION, SOLUTION INTRAVENOUS; SUBCUTANEOUS
Status: DISPENSED
Start: 2020-01-01

## (undated) RX ORDER — ASPIRIN 325 MG
TABLET ORAL
Status: DISPENSED
Start: 2020-01-01